# Patient Record
Sex: FEMALE | Race: WHITE | NOT HISPANIC OR LATINO | URBAN - METROPOLITAN AREA
[De-identification: names, ages, dates, MRNs, and addresses within clinical notes are randomized per-mention and may not be internally consistent; named-entity substitution may affect disease eponyms.]

---

## 2017-05-19 ENCOUNTER — EMERGENCY (EMERGENCY)
Facility: HOSPITAL | Age: 82
LOS: 0 days | Discharge: HOME | End: 2017-05-19

## 2017-06-28 DIAGNOSIS — E78.00 PURE HYPERCHOLESTEROLEMIA, UNSPECIFIED: ICD-10-CM

## 2017-06-28 DIAGNOSIS — R04.0 EPISTAXIS: ICD-10-CM

## 2017-06-28 DIAGNOSIS — I10 ESSENTIAL (PRIMARY) HYPERTENSION: ICD-10-CM

## 2017-06-28 DIAGNOSIS — Z79.899 OTHER LONG TERM (CURRENT) DRUG THERAPY: ICD-10-CM

## 2017-06-28 DIAGNOSIS — Z88.0 ALLERGY STATUS TO PENICILLIN: ICD-10-CM

## 2017-07-09 ENCOUNTER — OUTPATIENT (OUTPATIENT)
Dept: OUTPATIENT SERVICES | Facility: HOSPITAL | Age: 82
LOS: 1 days | Discharge: HOME | End: 2017-07-09

## 2017-07-09 DIAGNOSIS — R05 COUGH: ICD-10-CM

## 2018-08-23 ENCOUNTER — INPATIENT (INPATIENT)
Facility: HOSPITAL | Age: 83
LOS: 4 days | Discharge: NURSING, REHAB OR LONG-TERM CARE | End: 2018-08-28
Attending: INTERNAL MEDICINE | Admitting: INTERNAL MEDICINE

## 2018-08-23 VITALS
DIASTOLIC BLOOD PRESSURE: 62 MMHG | RESPIRATION RATE: 16 BRPM | OXYGEN SATURATION: 96 % | TEMPERATURE: 96 F | HEART RATE: 122 BPM | SYSTOLIC BLOOD PRESSURE: 142 MMHG

## 2018-08-23 LAB
ALBUMIN SERPL ELPH-MCNC: 3.8 G/DL — SIGNIFICANT CHANGE UP (ref 3.5–5.2)
ALP SERPL-CCNC: 79 U/L — SIGNIFICANT CHANGE UP (ref 30–115)
ALT FLD-CCNC: 10 U/L — SIGNIFICANT CHANGE UP (ref 0–41)
ANION GAP SERPL CALC-SCNC: 15 MMOL/L — HIGH (ref 7–14)
APPEARANCE UR: CLEAR — SIGNIFICANT CHANGE UP
AST SERPL-CCNC: 15 U/L — SIGNIFICANT CHANGE UP (ref 0–41)
BACTERIA # UR AUTO: ABNORMAL /HPF
BASOPHILS # BLD AUTO: 0.04 K/UL — SIGNIFICANT CHANGE UP (ref 0–0.2)
BASOPHILS NFR BLD AUTO: 0.3 % — SIGNIFICANT CHANGE UP (ref 0–1)
BILIRUB SERPL-MCNC: 0.8 MG/DL — SIGNIFICANT CHANGE UP (ref 0.2–1.2)
BILIRUB UR-MCNC: NEGATIVE — SIGNIFICANT CHANGE UP
BUN SERPL-MCNC: 15 MG/DL — SIGNIFICANT CHANGE UP (ref 10–20)
CALCIUM SERPL-MCNC: 8.9 MG/DL — SIGNIFICANT CHANGE UP (ref 8.5–10.1)
CHLORIDE SERPL-SCNC: 99 MMOL/L — SIGNIFICANT CHANGE UP (ref 98–110)
CO2 SERPL-SCNC: 26 MMOL/L — SIGNIFICANT CHANGE UP (ref 17–32)
COLOR SPEC: YELLOW — SIGNIFICANT CHANGE UP
CREAT SERPL-MCNC: 0.7 MG/DL — SIGNIFICANT CHANGE UP (ref 0.7–1.5)
DIFF PNL FLD: NEGATIVE — SIGNIFICANT CHANGE UP
EOSINOPHIL # BLD AUTO: 0.02 K/UL — SIGNIFICANT CHANGE UP (ref 0–0.7)
EOSINOPHIL NFR BLD AUTO: 0.2 % — SIGNIFICANT CHANGE UP (ref 0–8)
EPI CELLS # UR: ABNORMAL /HPF
GLUCOSE SERPL-MCNC: 164 MG/DL — HIGH (ref 70–99)
GLUCOSE UR QL: NEGATIVE — SIGNIFICANT CHANGE UP
HCT VFR BLD CALC: 45.4 % — SIGNIFICANT CHANGE UP (ref 37–47)
HGB BLD-MCNC: 15.1 G/DL — SIGNIFICANT CHANGE UP (ref 12–16)
IMM GRANULOCYTES NFR BLD AUTO: 0.6 % — HIGH (ref 0.1–0.3)
KETONES UR-MCNC: NEGATIVE — SIGNIFICANT CHANGE UP
LACTATE SERPL-SCNC: 1.7 MMOL/L — SIGNIFICANT CHANGE UP (ref 0.5–2.2)
LEUKOCYTE ESTERASE UR-ACNC: NEGATIVE — SIGNIFICANT CHANGE UP
LIDOCAIN IGE QN: 12 U/L — SIGNIFICANT CHANGE UP (ref 7–60)
LYMPHOCYTES # BLD AUTO: 0.62 K/UL — LOW (ref 1.2–3.4)
LYMPHOCYTES # BLD AUTO: 5 % — LOW (ref 20.5–51.1)
MAGNESIUM SERPL-MCNC: 1.9 MG/DL — SIGNIFICANT CHANGE UP (ref 1.8–2.4)
MCHC RBC-ENTMCNC: 28.5 PG — SIGNIFICANT CHANGE UP (ref 27–31)
MCHC RBC-ENTMCNC: 33.3 G/DL — SIGNIFICANT CHANGE UP (ref 32–37)
MCV RBC AUTO: 85.7 FL — SIGNIFICANT CHANGE UP (ref 81–99)
MONOCYTES # BLD AUTO: 1.82 K/UL — HIGH (ref 0.1–0.6)
MONOCYTES NFR BLD AUTO: 14.5 % — HIGH (ref 1.7–9.3)
NEUTROPHILS # BLD AUTO: 9.94 K/UL — HIGH (ref 1.4–6.5)
NEUTROPHILS NFR BLD AUTO: 79.4 % — HIGH (ref 42.2–75.2)
NITRITE UR-MCNC: NEGATIVE — SIGNIFICANT CHANGE UP
NRBC # BLD: 0 /100 WBCS — SIGNIFICANT CHANGE UP (ref 0–0)
PH UR: 6 — SIGNIFICANT CHANGE UP (ref 5–8)
PLATELET # BLD AUTO: 293 K/UL — SIGNIFICANT CHANGE UP (ref 130–400)
POTASSIUM SERPL-MCNC: 3.5 MMOL/L — SIGNIFICANT CHANGE UP (ref 3.5–5)
POTASSIUM SERPL-SCNC: 3.5 MMOL/L — SIGNIFICANT CHANGE UP (ref 3.5–5)
PROT SERPL-MCNC: 6.2 G/DL — SIGNIFICANT CHANGE UP (ref 6–8)
PROT UR-MCNC: 30
RBC # BLD: 5.3 M/UL — SIGNIFICANT CHANGE UP (ref 4.2–5.4)
RBC # FLD: 14.4 % — SIGNIFICANT CHANGE UP (ref 11.5–14.5)
SODIUM SERPL-SCNC: 140 MMOL/L — SIGNIFICANT CHANGE UP (ref 135–146)
SP GR SPEC: >=1.03 — SIGNIFICANT CHANGE UP (ref 1.01–1.03)
UROBILINOGEN FLD QL: 1 (ref 0.2–0.2)
WBC # BLD: 12.51 K/UL — HIGH (ref 4.8–10.8)
WBC # FLD AUTO: 12.51 K/UL — HIGH (ref 4.8–10.8)
WBC UR QL: SIGNIFICANT CHANGE UP /HPF

## 2018-08-23 RX ORDER — APIXABAN 2.5 MG/1
2.5 TABLET, FILM COATED ORAL ONCE
Qty: 0 | Refills: 0 | Status: COMPLETED | OUTPATIENT
Start: 2018-08-23 | End: 2018-08-23

## 2018-08-23 RX ORDER — ACETAMINOPHEN 500 MG
650 TABLET ORAL ONCE
Qty: 0 | Refills: 0 | Status: COMPLETED | OUTPATIENT
Start: 2018-08-23 | End: 2018-08-23

## 2018-08-23 RX ORDER — METHOCARBAMOL 500 MG/1
750 TABLET, FILM COATED ORAL ONCE
Qty: 0 | Refills: 0 | Status: COMPLETED | OUTPATIENT
Start: 2018-08-23 | End: 2018-08-23

## 2018-08-23 RX ADMIN — APIXABAN 2.5 MILLIGRAM(S): 2.5 TABLET, FILM COATED ORAL at 20:27

## 2018-08-23 RX ADMIN — METHOCARBAMOL 750 MILLIGRAM(S): 500 TABLET, FILM COATED ORAL at 12:49

## 2018-08-23 RX ADMIN — Medication 650 MILLIGRAM(S): at 12:49

## 2018-08-23 RX ADMIN — Medication 650 MILLIGRAM(S): at 13:50

## 2018-08-23 NOTE — ED ADULT NURSE NOTE - CHPI ED NUR SYMPTOMS NEG
no weakness/no vomiting/no confusion/no change in level of consciousness/no blurred vision/no loss of consciousness/no nausea/no numbness/no fever

## 2018-08-23 NOTE — ED ADULT NURSE REASSESSMENT NOTE - NS ED NURSE REASSESS COMMENT FT1
Pt A&OX4, on observation for inability to ambulate. Pt aware and compliant with plan of care. Pt received eliquis 2.5 mg PO as per home medication. Pt in no acute distress at this time. Will continue to monitor

## 2018-08-23 NOTE — ED CDU PROVIDER INITIAL DAY NOTE - OBJECTIVE STATEMENT
87 y/o female with PMHX of HTN, A. Fib on Eliquis, RA presenting under two midnight protocol. Pt currently lives with daughter but is in the downstairs apartment by herself. As per daughter, she went downstairs to check on mother, and tried to get her out of bed to use the bathroom, pt was unable to get up due to pain. Daughter states  pt has severe pain in B/L legs that she was unable to ambulate and had pain in her neck. Daughter does not want patient to  go to rehab/ nursing home facility. Pt for  and PT evaluation

## 2018-08-23 NOTE — ED CDU PROVIDER INITIAL DAY NOTE - PROGRESS NOTE DETAILS
The patient is awake and alert, states she is feeling better after medication she was given in ED, able to move all her extremities equally.

## 2018-08-23 NOTE — ED PROVIDER NOTE - OBJECTIVE STATEMENT
88 y f pmh RA, afib on eliquis, htn pw multiple complaints. Feeling dizzy for the past few days. Feels off balance when she walks. Associated with pain over the neck bilaterally. Worsens with movement, alleviated with rest. Also c/o diffuse abdominal pain. No alleviating or exacerbating factors. Was given prednisone for the neck pain yesterday by her PMD with no improvement. Also uses rituxin shots for her neck pain which she received last week with minimal relief. Denies n/v, fever, chills, dysuria, hematuria, diarrhea, constipation.

## 2018-08-23 NOTE — ED PROVIDER NOTE - PHYSICAL EXAMINATION
CONSTITUTIONAL: Well-developed; well-nourished; in no acute distress.   SKIN: warm, dry  HEAD: Normocephalic; atraumatic.  EYES: PERRL, EOMI, normal sclera and conjunctiva   ENT: No nasal discharge; airway clear. Tms clear, no infection/drainage, bulging membranes or erythema.   NECK: TTP over paraspinal muscles, pain with passive and active movement.   CARD: S1, S2 normal; no murmurs, gallops, or rubs. Regular rate and rhythm.   RESP: No wheezes, rales or rhonchi.  ABD: soft, nondistended. Diffuse ttp with no rebound/guarding.   EXT: Normal ROM.  No clubbing, cyanosis or edema.   LYMPH: No acute cervical adenopathy.  NEURO: Alert, oriented, grossly unremarkable. No cranial nerve deficits, moving all extremities.   PSYCH: Cooperative, appropriate.

## 2018-08-23 NOTE — ED ADULT NURSE NOTE - NSIMPLEMENTINTERV_GEN_ALL_ED
Implemented All Fall with Harm Risk Interventions:  [place] to call system. Call bell, personal items and telephone within reach. Instruct patient to call for assistance. Room bathroom lighting operational. Non-slip footwear when patient is off stretcher. Physically safe environment: no spills, clutter or unnecessary equipment. Stretcher in lowest position, wheels locked, appropriate side rails in place. Provide visual cue, wrist band, yellow gown, etc. Monitor gait and stability. Monitor for mental status changes and reorient to person, place, and time. Review medications for side effects contributing to fall risk. Reinforce activity limits and safety measures with patient and family. Provide visual clues: red socks.

## 2018-08-23 NOTE — ED PROVIDER NOTE - ATTENDING CONTRIBUTION TO CARE
88F to ED with c/o diziness, abd pain, neck pain and multiple medical complaints.  To Ed with daughter and states sx are improving while in ED and diziness has been chronic over several years. No fevers, no sick contacts, no travels.     AVSS, exam as noted, CTAB, RRR, abdomen soft NTND, (+) bowel sounds,

## 2018-08-23 NOTE — ED PROVIDER NOTE - NS ED ROS FT
Eyes:  No visual changes, eye pain or discharge.  ENMT:  Neck pain. No hearing changes, pain, discharge or infections.   Cardiac:  No chest pain, SOB or edema.   Respiratory:  No cough or respiratory distress.   GI:  Abdominal pain. No nausea, vomiting, diarrhea  :  No dysuria, frequency or burning.  MS:  No myalgia, muscle weakness, joint pain or back pain.  Neuro:  Dizziness. No headache or weakness.  No LOC.  Skin:  No skin rash.   Endocrine: No history of thyroid disease or diabetes.

## 2018-08-23 NOTE — ED PROVIDER NOTE - MEDICAL DECISION MAKING DETAILS
pt not able to ambulate at baseline as per daughter and does not feel comfortable taking her home as she lives alone, will place in obs for pt and social work maggie jaramillo pending, obs team aware to follow up

## 2018-08-23 NOTE — ED ADULT NURSE NOTE - OBJECTIVE STATEMENT
Pt AOx3, ambulatory with assist, c/o neck pain, subjective fever and worsening LLE pain since yesterday. Pt indicates chronic dizziness. Deneis n/v, problems with BM or urination, No SOB, no chest pain, no recent falls/trauma, changes in LOC.

## 2018-08-23 NOTE — ED CDU PROVIDER INITIAL DAY NOTE - ATTENDING CONTRIBUTION TO CARE
87 yo female PMH as noted placed in EDOU for observation; patient c/o today f b/l leg pain and difficulties ambulating.  In addition, she reported neck pain for which her doctor prescribed meds which did not help.  No headache or focal weakness.  Patient able to lift both lower extremities against gravity, equal and strong hand  b/l, awake and alert and follows all directions appropriately.  Will observe and reassess for ability to ambulate ( usually ambulates with a walker).

## 2018-08-24 DIAGNOSIS — G56.00 CARPAL TUNNEL SYNDROME, UNSPECIFIED UPPER LIMB: Chronic | ICD-10-CM

## 2018-08-24 RX ORDER — AMLODIPINE BESYLATE 2.5 MG/1
0 TABLET ORAL
Qty: 90 | Refills: 0 | COMMUNITY

## 2018-08-24 RX ORDER — APIXABAN 2.5 MG/1
2.5 TABLET, FILM COATED ORAL ONCE
Qty: 0 | Refills: 0 | Status: COMPLETED | OUTPATIENT
Start: 2018-08-24 | End: 2018-08-24

## 2018-08-24 RX ORDER — AMLODIPINE BESYLATE 2.5 MG/1
2.5 TABLET ORAL DAILY
Qty: 0 | Refills: 0 | Status: DISCONTINUED | OUTPATIENT
Start: 2018-08-24 | End: 2018-08-28

## 2018-08-24 RX ORDER — APIXABAN 2.5 MG/1
2.5 TABLET, FILM COATED ORAL EVERY 12 HOURS
Qty: 0 | Refills: 0 | Status: DISCONTINUED | OUTPATIENT
Start: 2018-08-24 | End: 2018-08-28

## 2018-08-24 RX ORDER — AMLODIPINE BESYLATE 2.5 MG/1
2.5 TABLET ORAL ONCE
Qty: 0 | Refills: 0 | Status: COMPLETED | OUTPATIENT
Start: 2018-08-24 | End: 2018-08-24

## 2018-08-24 RX ORDER — METHOCARBAMOL 500 MG/1
750 TABLET, FILM COATED ORAL ONCE
Qty: 0 | Refills: 0 | Status: COMPLETED | OUTPATIENT
Start: 2018-08-24 | End: 2018-08-24

## 2018-08-24 RX ORDER — IBUPROFEN 200 MG
600 TABLET ORAL EVERY 6 HOURS
Qty: 0 | Refills: 0 | Status: DISCONTINUED | OUTPATIENT
Start: 2018-08-24 | End: 2018-08-28

## 2018-08-24 RX ORDER — SIMVASTATIN 20 MG/1
20 TABLET, FILM COATED ORAL AT BEDTIME
Qty: 0 | Refills: 0 | Status: DISCONTINUED | OUTPATIENT
Start: 2018-08-24 | End: 2018-08-28

## 2018-08-24 RX ORDER — ACETAMINOPHEN 500 MG
650 TABLET ORAL ONCE
Qty: 0 | Refills: 0 | Status: COMPLETED | OUTPATIENT
Start: 2018-08-24 | End: 2018-08-24

## 2018-08-24 RX ORDER — HYDROCHLOROTHIAZIDE 25 MG
12.5 TABLET ORAL AT BEDTIME
Qty: 0 | Refills: 0 | Status: DISCONTINUED | OUTPATIENT
Start: 2018-08-24 | End: 2018-08-28

## 2018-08-24 RX ORDER — SIMVASTATIN 20 MG/1
0 TABLET, FILM COATED ORAL
Qty: 90 | Refills: 0 | COMMUNITY

## 2018-08-24 RX ORDER — APIXABAN 2.5 MG/1
0 TABLET, FILM COATED ORAL
Qty: 180 | Refills: 0 | COMMUNITY

## 2018-08-24 RX ADMIN — APIXABAN 2.5 MILLIGRAM(S): 2.5 TABLET, FILM COATED ORAL at 21:55

## 2018-08-24 RX ADMIN — AMLODIPINE BESYLATE 2.5 MILLIGRAM(S): 2.5 TABLET ORAL at 09:41

## 2018-08-24 RX ADMIN — SIMVASTATIN 20 MILLIGRAM(S): 20 TABLET, FILM COATED ORAL at 21:56

## 2018-08-24 RX ADMIN — Medication 20 MILLIGRAM(S): at 09:41

## 2018-08-24 RX ADMIN — METHOCARBAMOL 750 MILLIGRAM(S): 500 TABLET, FILM COATED ORAL at 09:41

## 2018-08-24 RX ADMIN — Medication 12.5 MILLIGRAM(S): at 21:56

## 2018-08-24 RX ADMIN — APIXABAN 2.5 MILLIGRAM(S): 2.5 TABLET, FILM COATED ORAL at 09:41

## 2018-08-24 RX ADMIN — Medication 650 MILLIGRAM(S): at 17:55

## 2018-08-24 NOTE — H&P ADULT - ASSESSMENT
88 y f pmh RA, afib on eliquis, htn pw     ·	Dizziness: Feeling dizzy for the past few days. Feels off balance when she walks.   ·	Neck pain:  Worsens with movement, alleviated with rest. was given prednisone by pmd wtihout improvement.  uses Rituxan shots, last rec'ed last week with minimal relief.   Abd pain: No alleviating or exacerbating factors. Was given prednisone for the neck pain yesterday by her PMD with no improvement.       RA (rheumatoid arthritis)  Afib:  cw eliquis  HTN :  cw    CHG 4% bath daily and prn  DVT PPX  DISPO:  STR in SNF vs home VN per physiatry 88 y f pmh RA (receiving rituxan infusions,  last one ~1 week ptp), afib on eliquis, htn, hodgkin's lymphoma (in remission per pt) pw ~2d history of acute worsening of chr neck pain.      NECK PAIN / RA  --  imaging:   ·	ct neck:    ·	1.  No evidence of acute cervical spine fracture or subluxation.  ·	2.  Diffuse osteopenia and multilevel degenerative changes as described.  --  ptrehab:  rec snf vs home vn (pt/fam interested in snf,  would like to speak with sw)  --  cw rituxn inf o/p      Afib:  cw eliquis  HTN :  cw hctz, amlodpine    CHG 4% bath daily and prn  DVT PPX  DISPO:  STR in SNF vs home VN per physiatry; / f/u for dispo

## 2018-08-24 NOTE — ED CDU PROVIDER SUBSEQUENT DAY NOTE - NS ED ROS FT
Except as documented in HPI, all other ROS negative.   GENERAL: Denies fever/chills, loss of appetite/weight or fatigue.  SKIN: Denies rashes, abrasions, lacerations, ecchymosis, erythema, or edema.  HEAD: Denies headache, dizziness or trauma.  EYES: Denies blurry vision, diplopia, or photophobia.  ENT: Denies earaches, discharge or hearing loss. Denies nasal discharge or epistaxis. Denies sore throat.   CARDIAC: Denies chest pain, palpitations, or SOB.   RESPIRATORY: Denies SOB, cough, hemoptysis or wheezing.   GI: Denies abdominal pain, n/v/d.   MSK: + b/l leg & neck pain.   NEURO: Denies paresthesias, tingling, weakness.

## 2018-08-24 NOTE — ED ADULT NURSE REASSESSMENT NOTE - NS ED NURSE REASSESS COMMENT FT1
Patient aaox3 denies any pain, currently awaiting to be seen by pt in am, In no distress will continue to monitor. V/s within normal limits. Patient aaox3 denies any pain, currently awaiting to be seen by social workers in am, patient in no distress will continue to monitor. V/s within normal limits.

## 2018-08-24 NOTE — H&P ADULT - HISTORY OF PRESENT ILLNESS
88 y f pmh RA, afib on eliquis, htn pw     ·	Dizziness: Feeling dizzy for the past few days. Feels off balance when she walks.   ·	Neck pain:  Worsens with movement, alleviated with rest. was given prednisone by pmd wtihout improvement.  uses Rituxan shots, last rec'ed last week with minimal relief.   ·	Abd pain: No alleviating or exacerbating factors. Was given prednisone for the neck pain yesterday by her PMD with no improvement. 88 y f pmh RA (receiving rituxan infusions,  last one ~1 week ptp), afib on eliquis, htn, hodgkin's lymphoma (in remission per pt) pw ~2d history of acute worsening of chr neck pain.      Pt states she's had neck pains and other joint pains from her RA for years,  but since she's been on rituxan infusions, her pains have been greatly alleviated.  However ~2d ptp,  pt started to have acute neck pains,  radiating to b/l shoulders, that prevented her from ambulating.  Her pains are worsened by passive leg raise (performed by daughter at home), which made her neck pains radiate down her spine (pt states it normally doesn't radiate down her back, but it did this time bc her daughter raised her leg too high - 'into the ceiling').  Her pains were not alleviated, and per daughter pt spiked a low grade temp of 100.1, so she decided to bring pt to the Ed  .   Aside from above symptoms,  ros positive for  ·	Dizziness: Feeling dizzy for the past few days. Feels off balance when she walks.   ·	Occasional constipation.     Pt otherwise denied any chills/rigors,  cough, sob, cp, abd pain, n/v/d.

## 2018-08-24 NOTE — ED CDU PROVIDER DISPOSITION NOTE - CLINICAL COURSE
Elderly pt h/o RA on rituxan placed in CDU in consideration of rehab for pain and difficulty ambulating. Pt was observed in the ED, had some waxing and waning sx, daughter uncomfortable with d/c. Pt seen by physiatry, rec pain management and PT. Pt admitted to medical floor.

## 2018-08-24 NOTE — H&P ADULT - NSHPPHYSICALEXAM_GEN_ALL_CORE
Constitutional: non-toxic,  not in acute distress  HEENT: eomi,  wnl  Respiratory:  clear lung sounds b/l;   Cardiovascular:  s1, s2,  irregular  Gastrointestinal:  nontender, distended, +bowel sounds  Extremities: no edema b/l le  Neurological: nonfocal; no spinal tenderness on palpation    ------------------------------------------------------------------     VITAL SIGNS   T(F): 97.9 (08-24 @ 15:31), Max: 99.4 (08-24 @ 00:04)  HR: 107 (08-24 @ 18:12)  BP: 135/63 (08-24 @ 15:31)  RR: 18 (08-24 @ 15:31)  SpO2: 97% (08-24 @ 15:31)

## 2018-08-24 NOTE — ED CDU PROVIDER SUBSEQUENT DAY NOTE - PROGRESS NOTE DETAILS
Pt seen an examined this morning. Per pt and daughter at bedside, pt has h/o RA with Rituxin infusions about 4x per year, which controls symptoms very well. Pt has occasional breakthrough pain which resolves with tylenol. Daughter lives in apt above mother and gives meals, meds and supervises showering, but otherwise pt is independent w ADLS, ambulatory around her apt independently etc. Last Rituxan tx was 8/14. Daughter reports that pt began c/o neck and shoulder pain 2 nights ago, called rheumatologist Dr Matta (Dr Romero responded) and was told to give prednisone 15mg. Pain persisted next day, so she called back and was told to increase prednisone to 20mg. She felt concerned about higher dosing and pain seemed worse yesterday, so came to the ED. Labs, head CT, abd/pelvis CT were unremarkable, but concern was for unsafe d/c vs need for PT. Pt was given robaxin which improved sx somewhat. On my eval pt awake and alert, eating breakfast, cheerful and conversant. Daughter at bedside. VS as noted, pt is elderly but well appearing. She does c/o pain with rotation of the neck and there is palpable spasm of the cervical paraspinal and trapezius over the L shoulder. She is able to get to the edge of the bed unassisted and stands with just a little help; she is able to walk about the room, only c/o the same neck and shoulder pain. Daughter is adamant that she does not want placement, pt does not want aide or even PT. I do not think PT would be helpful with this acute flare of sx; will give prednisone and robaxin, speak with Dr Romero with goal of d/c home with prompt reassessment as outpt. Spoke with Dr Romero, covering for pt's rheumatologist Dr Matta. She rec CT scan as pts with longstanding RA can have subluxation of the cspine. Pt currently sleeping; if CT is ok, will consult rehab/SW. Pt is very drowsy now, possibly from the Robaxin given earlier; daughter now very concerned and does not want to take her home. Still awaiting CT neck; at this point if pt is not closer to baseline and CT is negative, she may require admission for rehab evaluation over the weekend. Will continue to observe, f/u CT and reassess. CT c-spine with degenerative changes, no acute subluxation. Pt more alert now, conversant, but still c/o neck pain. Physiatry at the bedside for eval for PT. Pt seen by physiatry. Rec bedside PT, pain management eval; could have PT as outpt with VNS vs short term rehab; daughter was initially uncomfortable with anything but discharge, now does not want to take pt home as she still has pain, and would consider short term rehab. Will give tylenol and admit for possible short term rehab.

## 2018-08-24 NOTE — CONSULT NOTE ADULT - ASSESSMENT
IMPRESSION: Rehab of gait dysfunction      PRECAUTIONS: [  ] Cardiac  [  ] Respiratory  [  ] Seizures [  ] Contact Isolation  [  ] Droplet Isolation  [  ] Other    Weight Bearing Status:     RECOMMENDATION: better pain control / pain management eval    Out of Bed to Chair     DVT/Decubiti Prophylaxis    REHAB PLAN:     [ x  ] Bedside P/T 3-5 times a week   [   ]   Bedside O/T  2-3 times a week             [   ] No Rehab Therapy Indicated                   [   ]  Speech Therapy   Conditioning/ROM                                    ADL  Bed Mobility                                               Conditioning/ROM  Transfers                                                     Bed Mobility  Sitting /Standing Balance                         Transfers                                        Gait Training                                               Sitting/Standing Balance  Stair Training [   ]Applicable                    Home equipment Eval                                                                        Splinting  [   ] Only      GOALS:   ADL   [   ]   Independent                    Transfers  [ x  ] Independent                          Ambulation  [ x  ] Independent     [  x  ] With device                            [   ]  CG                                                         [   ]  CG                                                                  [   ] CG                            [    ] Min A                                                   [   ] Min A                                                              [   ] Min  A          DISCHARGE PLAN:   [   ]  Good candidate for Intensive Rehabilitation/Hospital based-4A [facility]                                             Will tolerate 3hrs Intensive Rehab Daily                                       [ x   ]  Short Term Rehab in Skilled Nursing Facility                           vs            [  xx  ]  Home with Outpatient or VN services                                         [    ]  Possible Candidate for Intensive Hospital based Rehab

## 2018-08-24 NOTE — ED CDU PROVIDER SUBSEQUENT DAY NOTE - HISTORY
Pt resting comfortably at this time. Pt given dose of Eliquis as ordered. Pt with no complaints. Awaiting social work eval for PT in the AM - daughter prefers home PT as opposed to permanent placement in facility. Pt reporting no pain after receiving Tylenol & Robaxin.

## 2018-08-24 NOTE — PHYSICAL THERAPY INITIAL EVALUATION ADULT - SPECIFY REASON(S)
Chart reviewed. Pt. currently without activity orders. PT evaluation on hold pending OOB orders as appropriate. Will follow.

## 2018-08-24 NOTE — ED CDU PROVIDER SUBSEQUENT DAY NOTE - PHYSICAL EXAMINATION
VITAL SIGNS: I have reviewed the initial vital signs.   CONSTITUTIONAL: Awake, alert. Well-developed; well-nourished; in no distress. Non-toxic appearing.   SKIN: No rash, vesicles/lesion, abrasions or lacerations. No ecchymosis or signs of trauma.   HEAD: Normocephalic; atraumatic.   EYES: Symmetrical, no discharge or signs of trauma. Conjunctiva and sclera clear.  ENT: Airway patent. MMM.   NECK: Supple; non-tender.   CARD: No chest wall deformity or tenderness. S1, S2 normal; no murmurs, gallops, or rubs. Regular rate and rhythm.  RESP: Good air movement. Lungs CTAB. No crackles, wheezes, rales or rhonchi.  ABD: Soft; non-distended; non-tender.   EXT: No bony deformity or tenderness. Normal ROM x 4 extremities.   NEURO: Strength 5/5 UE/LE b/l. No sensory deficits. n/v intact UE/LE b/l, pulses symmetrical.  PSYCH: Cooperative, appropriate.

## 2018-08-24 NOTE — CONSULT NOTE ADULT - SUBJECTIVE AND OBJECTIVE BOX
HPI: 87 yo F admitted on 8/23 for neck pain / knee pain / unable to walk. Denies any trauma, xray negative for fx. Prior to hospitalization she was ambulating with QC      PAST MEDICAL & SURGICAL HISTORY:  Afib  HTN (hypertension)      Hospital Course:    TODAY'S SUBJECTIVE & REVIEW OF SYMPTOMS:     Constitutional WNL   Cardio WNL   Resp WNL   GI WNL  Heme WNL  Endo WNL  Skin WNL  MSK joint pain  Neuro WNL  Cognitive WNL  Psych WNL      MEDICATIONS  (STANDING):    MEDICATIONS  (PRN):      FAMILY HISTORY:      Allergies    penicillin (Short breath; Rash)    Intolerances        SOCIAL HISTORY:    [  ] Etoh  [  ] Smoking  [  ] Substance abuse     Home Environment:  [  ] Home Alone  [ x ] Lives with Family  [  ] Home Health Aid    Dwelling:  [  ] Apartment  [x  ] Private House  [  ] Adult Home  [  ] Skilled Nursing Facility      [  ] Short Term  [  ] Long Term  [x  ] Stairs       Elevator [  ]    FUNCTIONAL STATUS PTA: (Check all that apply)  Ambulation: [ x  ]Independent    [  ] Dependent     [  ] Non-Ambulatory  Assistive Device: [  ] SA Cane  [x  ]  Q Cane  [  ] Walker  [  ]  Wheelchair  ADL : [ x ] Independent  [  ]  Dependent       Vital Signs Last 24 Hrs  T(C): 36.6 (24 Aug 2018 15:31), Max: 37.4 (24 Aug 2018 00:04)  T(F): 97.9 (24 Aug 2018 15:31), Max: 99.4 (24 Aug 2018 00:04)  HR: 113 (24 Aug 2018 15:31) (75 - 113)  BP: 135/63 (24 Aug 2018 15:31) (103/65 - 141/63)  BP(mean): --  RR: 18 (24 Aug 2018 15:31) (17 - 20)  SpO2: 97% (24 Aug 2018 15:31) (96% - 99%)      PHYSICAL EXAM: Alert & Oriented X3  GENERAL: NAD, well-groomed, well-developed  HEAD:  Atraumatic, Normocephalic  CHEST/LUNG: Clear  HEART: S1S2+  ABDOMEN: Soft, Nontender  EXTREMITIES:  no calf tenderness    NERVOUS SYSTEM:  Cranial Nerves 2-12 intact [  ] Abnormal  [  ]  ROM: WFL all extremities [  ]  Abnormal [x  ]limited left shoulder  Motor Strength: WFL all extremities  [  ]  Abnormal [x  ]limited lue  Sensation: intact to light touch [  ] Abnormal [  ]  Reflexes: Symmetric [  ]  Abnormal [  ]    FUNCTIONAL STATUS:  Bed Mobility: Independent [  ]  Supervision [  ]  Needs Assistance [x  ]  N/A [  ]  Transfers: Independent [  ]  Supervision [  ]  Needs Assistance [x  ]  N/A [  ]   Ambulation: Independent [  ]  Supervision [  ]  Needs Assistance [  ]  N/A [  ]  ADL: Independent [  ] Requires Assistance [  ] N/A [  ]      LABS:                        15.1   12.51 )-----------( 293      ( 23 Aug 2018 12:45 )             45.4     08-23    140  |  99  |  15  ----------------------------<  164<H>  3.5   |  26  |  0.7    Ca    8.9      23 Aug 2018 12:45  Mg     1.9     08-23    TPro  6.2  /  Alb  3.8  /  TBili  0.8  /  DBili  x   /  AST  15  /  ALT  10  /  AlkPhos  79  08-23      Urinalysis Basic - ( 23 Aug 2018 17:00 )    Color: Yellow / Appearance: Clear / SG: >=1.030 / pH: x  Gluc: x / Ketone: Negative  / Bili: Negative / Urobili: 1.0   Blood: x / Protein: 30 / Nitrite: Negative   Leuk Esterase: Negative / RBC: x / WBC 3-5 /HPF   Sq Epi: x / Non Sq Epi: Few /HPF / Bacteria: Few /HPF        RADIOLOGY & ADDITIONAL STUDIES:    Assesment:

## 2018-08-24 NOTE — H&P ADULT - PMH
Afib    HTN (hypertension) Afib    Hodgkin lymphoma    HTN (hypertension)    RA (rheumatoid arthritis)

## 2018-08-25 LAB
ALBUMIN SERPL ELPH-MCNC: 3.6 G/DL — SIGNIFICANT CHANGE UP (ref 3.5–5.2)
ALP SERPL-CCNC: 83 U/L — SIGNIFICANT CHANGE UP (ref 30–115)
ALT FLD-CCNC: 19 U/L — SIGNIFICANT CHANGE UP (ref 0–41)
ANION GAP SERPL CALC-SCNC: 16 MMOL/L — HIGH (ref 7–14)
AST SERPL-CCNC: 22 U/L — SIGNIFICANT CHANGE UP (ref 0–41)
BASOPHILS # BLD AUTO: 0.04 K/UL — SIGNIFICANT CHANGE UP (ref 0–0.2)
BASOPHILS NFR BLD AUTO: 0.3 % — SIGNIFICANT CHANGE UP (ref 0–1)
BILIRUB SERPL-MCNC: 0.6 MG/DL — SIGNIFICANT CHANGE UP (ref 0.2–1.2)
BUN SERPL-MCNC: 13 MG/DL — SIGNIFICANT CHANGE UP (ref 10–20)
CALCIUM SERPL-MCNC: 8.9 MG/DL — SIGNIFICANT CHANGE UP (ref 8.5–10.1)
CHLORIDE SERPL-SCNC: 99 MMOL/L — SIGNIFICANT CHANGE UP (ref 98–110)
CO2 SERPL-SCNC: 26 MMOL/L — SIGNIFICANT CHANGE UP (ref 17–32)
CREAT SERPL-MCNC: 0.5 MG/DL — LOW (ref 0.7–1.5)
EOSINOPHIL # BLD AUTO: 0.14 K/UL — SIGNIFICANT CHANGE UP (ref 0–0.7)
EOSINOPHIL NFR BLD AUTO: 1 % — SIGNIFICANT CHANGE UP (ref 0–8)
GLUCOSE SERPL-MCNC: 103 MG/DL — HIGH (ref 70–99)
HCT VFR BLD CALC: 44 % — SIGNIFICANT CHANGE UP (ref 37–47)
HGB BLD-MCNC: 14.7 G/DL — SIGNIFICANT CHANGE UP (ref 12–16)
IMM GRANULOCYTES NFR BLD AUTO: 0.7 % — HIGH (ref 0.1–0.3)
LYMPHOCYTES # BLD AUTO: 1.03 K/UL — LOW (ref 1.2–3.4)
LYMPHOCYTES # BLD AUTO: 7.6 % — LOW (ref 20.5–51.1)
MAGNESIUM SERPL-MCNC: 2.1 MG/DL — SIGNIFICANT CHANGE UP (ref 1.8–2.4)
MCHC RBC-ENTMCNC: 28.3 PG — SIGNIFICANT CHANGE UP (ref 27–31)
MCHC RBC-ENTMCNC: 33.4 G/DL — SIGNIFICANT CHANGE UP (ref 32–37)
MCV RBC AUTO: 84.8 FL — SIGNIFICANT CHANGE UP (ref 81–99)
MONOCYTES # BLD AUTO: 1.83 K/UL — HIGH (ref 0.1–0.6)
MONOCYTES NFR BLD AUTO: 13.6 % — HIGH (ref 1.7–9.3)
NEUTROPHILS # BLD AUTO: 10.34 K/UL — HIGH (ref 1.4–6.5)
NEUTROPHILS NFR BLD AUTO: 76.8 % — HIGH (ref 42.2–75.2)
PLATELET # BLD AUTO: 283 K/UL — SIGNIFICANT CHANGE UP (ref 130–400)
POTASSIUM SERPL-MCNC: 3.4 MMOL/L — LOW (ref 3.5–5)
POTASSIUM SERPL-SCNC: 3.4 MMOL/L — LOW (ref 3.5–5)
PROT SERPL-MCNC: 6 G/DL — SIGNIFICANT CHANGE UP (ref 6–8)
RBC # BLD: 5.19 M/UL — SIGNIFICANT CHANGE UP (ref 4.2–5.4)
RBC # FLD: 14.1 % — SIGNIFICANT CHANGE UP (ref 11.5–14.5)
SODIUM SERPL-SCNC: 141 MMOL/L — SIGNIFICANT CHANGE UP (ref 135–146)
WBC # BLD: 13.47 K/UL — HIGH (ref 4.8–10.8)
WBC # FLD AUTO: 13.47 K/UL — HIGH (ref 4.8–10.8)

## 2018-08-25 RX ORDER — GLYCERIN ADULT
1 SUPPOSITORY, RECTAL RECTAL DAILY
Qty: 0 | Refills: 0 | Status: DISCONTINUED | OUTPATIENT
Start: 2018-08-25 | End: 2018-08-28

## 2018-08-25 RX ORDER — ACETAMINOPHEN 500 MG
650 TABLET ORAL ONCE
Qty: 0 | Refills: 0 | Status: COMPLETED | OUTPATIENT
Start: 2018-08-25 | End: 2018-08-25

## 2018-08-25 RX ORDER — CHOLECALCIFEROL (VITAMIN D3) 125 MCG
1000 CAPSULE ORAL DAILY
Qty: 0 | Refills: 0 | Status: DISCONTINUED | OUTPATIENT
Start: 2018-08-25 | End: 2018-08-28

## 2018-08-25 RX ADMIN — APIXABAN 2.5 MILLIGRAM(S): 2.5 TABLET, FILM COATED ORAL at 21:44

## 2018-08-25 RX ADMIN — AMLODIPINE BESYLATE 2.5 MILLIGRAM(S): 2.5 TABLET ORAL at 06:35

## 2018-08-25 RX ADMIN — Medication 650 MILLIGRAM(S): at 18:09

## 2018-08-25 RX ADMIN — SIMVASTATIN 20 MILLIGRAM(S): 20 TABLET, FILM COATED ORAL at 21:44

## 2018-08-25 RX ADMIN — APIXABAN 2.5 MILLIGRAM(S): 2.5 TABLET, FILM COATED ORAL at 10:40

## 2018-08-25 RX ADMIN — Medication 12.5 MILLIGRAM(S): at 21:44

## 2018-08-25 NOTE — PROGRESS NOTE ADULT - SUBJECTIVE AND OBJECTIVE BOX
08-25-18 @ 11:29  KVNG REDDY  88yFemale  Unknown to me before, a patient of Dr. Omalley, last seen > a yr ago.  Seen in 4B8B. No ac c/o now. HOB elevated to ~50'.    Hx reviewed from pt, as well as calling home, speaking to Carlos, son in law, whose home, pt lives independently overall, as a mother daughter apt arrangement.       Patient is a 88y old  Female who presents with a chief complaint of neck pain (24 Aug 2018 19:14), & neck rigidity, progressively worse lately, causing her unable to turn around, get up from bed easily. Normally she manages herself, with some help from dtr & son in law.       HPI:  88 y f pmh RA (receiving rituxan infusions,  last one ~1 week ptp), afib on eliquis, htn, hodgkin's lymphoma (in remission per pt) pw ~2d history of acute worsening of chr neck pain.      Pt states she's had neck pains and other joint pains from her RA for years,  but since she's been on rituxan infusions, her pains have been greatly alleviated.  However ~2d ptp,  pt started to have acute neck pains,  radiating to b/l shoulders, that prevented her from ambulating.  Her pains are worsened by passive leg raise (performed by daughter at home), which made her neck pains radiate down her spine (pt states it normally doesn't radiate down her back, but it did this time bc her daughter raised her leg too high - 'into the ceiling').  Her pains were not alleviated, and per daughter pt spiked a low grade temp of 100.1, so she decided to bring pt to the Ed  .   Aside from above symptoms,  ros positive for  ·	Dizziness: Feeling dizzy for the past few days. Feels off balance when she walks.   ·	Occasional constipation.     Pt otherwise denied any chills/rigors,  cough, sob, cp, abd pain, n/v/d. (24 Aug 2018 19:14)      REVIEW OF SYSTEMS      General:	Overall OK    Respiratory and Thorax: No sx  	  Cardiovascular:	Chr, afib, does f/u cardio, on a/c, stable BP    Musculoskeletal:	 stiff neck. Known hx RA, no acute jt pains otherwise.     Neurological:	Chronic dementia    Psychiatric:	OK    Hematology/Lymphatics:	 No issue    Allergic/Immunologic:	    penicillin (Short breath; Rash)      Home Medications:  amLODIPine 2.5 mg oral tablet: 1 tab(s) orally once a day (24 Aug 2018 19:16)  Eliquis 2.5 mg oral tablet: 1 tab(s) orally 2 times a day (24 Aug 2018 19:16)  FLUTICASONE PROPIONATE 50 MCG/ACT SUSP:  (24 Aug 2018 19:16)  hydroCHLOROthiazide 12.5 mg oral tablet: 1 tab(s) orally once a day (24 Aug 2018 19:16)  simvastatin 20 mg oral tablet: orally once a day (24 Aug 2018 19:16)      MEDICATIONS  (STANDING):  amLODIPine   Tablet 2.5 milliGRAM(s) Oral daily  apixaban 2.5 milliGRAM(s) Oral every 12 hours  hydrochlorothiazide 12.5 milliGRAM(s) Oral at bedtime  simvastatin 20 milliGRAM(s) Oral at bedtime    MEDICATIONS  (PRN):  ibuprofen  Tablet 600 milliGRAM(s) Oral every 6 hours PRN Mild pain      PAST MEDICAL & SURGICAL HISTORY:  Hodgkin lymphoma  RA (rheumatoid arthritis)  Afib  HTN (hypertension)  Carpal tunnel syndrome      T(C): 36 (08-25-18 @ 07:31), Max: 36.6 (08-24-18 @ 15:31)  HR: 89 (08-25-18 @ 07:31) (89 - 113)  BP: 138/65 (08-25-18 @ 07:31) (125/68 - 138/65)  RR: 18 (08-25-18 @ 07:31) (18 - 95)  SpO2: 95% (08-25-18 @ 07:31) (94% - 97%)    PHYSICAL EXAM:      Constitutional: WBWN    Eyes: EOMI    Neck: Stiff, difficult ROM    Back: Upper stiffness, diff to lay flat, no palp deformity    Respiratory: NVBS, good AE    Cardiovascular: variable S1,    Gastrointestinal: benign abdo, no palp lesion    Extremities: No CCE, good SLR BL    Vascular: palp pedal pulse.     Neurological: Dementia ++, cooperative w/o confusion or agitation. DTR normal. No focal deficit    Skin: warm, no pallor. Healthy    Musculoskeletal: No atrophy. No active inflamed jts.     Psychiatric: amiable. Dementia                              14.7   13.47 )-----------( 283      ( 25 Aug 2018 06:35 )             44.0       08-25    141  |  99  |  13  ----------------------------<  103<H>  3.4<L>   |  26  |  0.5<L>    Ca    8.9      25 Aug 2018 06:35  Mg     2.1     08-25    TPro  6.0  /  Alb  3.6  /  TBili  0.6  /  DBili  x   /  AST  22  /  ALT  19  /  AlkPhos  83  08-25      Urinalysis Basic - ( 23 Aug 2018 17:00 )    Color: Yellow / Appearance: Clear / SG: >=1.030 / pH: x  Gluc: x / Ketone: Negative  / Bili: Negative / Urobili: 1.0   Blood: x / Protein: 30 / Nitrite: Negative   Leuk Esterase: Negative / RBC: x / WBC 3-5 /HPF   Sq Epi: x / Non Sq Epi: Few /HPF / Bacteria: Few /HPF    ECG :     Ventricular Rate 103 BPM    Atrial Rate 119 BPM    QRS Duration 80 ms    Q-T Interval 356 ms    QTC Calculation(Bezet) 466 ms    R Axis 66 degrees    T Axis 266 degrees    Diagnosis Line Atrial fibrillation with rapid ventricular response  Nonspecific T wave abnormality  Abnormal ECG    Confirmed by Orlando Viveros ([id number]) on 8/23/2018 12:36:21 PM      CT neck :     Findings:    Bones are diffusely osteopenic.    There is no evidence of acute fracture, compression deformity or facet   subluxation.    There is multilevel degenerative loss of disc space height. Trace   anterolisthesis C2-3 and C3-4.    At C2-3 there is disc bulging and facet hypertrophy without significant   canal or foraminal stenosis.    At C3-4 there is disc bulging and uncovertebral and facet hypertrophy   with moderate right and mild left foraminal stenosis.    At C4-5 there is disc osteophyte indenting the ventral thecal sac and   uncovertebral and facet hypertrophy with mild to moderate foraminal   stenosis.    At C5-6 there is disc osteophyte indenting the ventral thecal sac and   uncovertebral and facet hypertrophy with mild right foraminal stenosis.    At C6-7 there is disc osteophyte indenting the ventral thecal sac and   uncinate spurring with mild foraminal stenosis.    IMPRESSION:    1.  No evidence of acute cervical spine fracture or subluxation.    2.  Diffuse osteopenia and multilevel degenerative changes as described.        TIMOTEO PRESTON M.D., ATTENDING RADIOLOGIST  This document has been electronically signed. Aug 24 2018  3:18PM        CT A P :    FINDINGS:    LOWER CHEST: Bibasilar reticular opacities, possibly reflecting fibrosis.    HEPATOBILIARY: Unremarkable.    SPLEEN: Unremarkable.    PANCREAS: Unremarkable.    ADRENAL GLANDS: Unremarkable.    KIDNEYS: Bilateral renal cysts measure up to 2.2 cmwithin the left   interpolar region. Additional subcentimeter left renal lower pole   hypodensities are too small to further characterize. No hydronephrosis.     ABDOMINOPELVIC NODES: Unremarkable.    PELVIC ORGANS: An ovoid intrauterine device is noted.    PERITONEUM/MESENTERY/BOWEL: Sigmoid and descending colonic   diverticulosis, without evidence of diverticulitis. No evidence of bowel   obstruction. No ascites or free intraperitoneal air. Normal caliber   appendix.    BONES/SOFT TISSUES: Degenerative changes of the spine.      OTHER: Atherosclerotic vascular calcification.      IMPRESSION:     1. No evidence of acute/inflammatory process within the abdomen or pelvis.    2. Sigmoid and descending colonic diverticulosis, without evidence of  diverticulitis.        ALYX KING M.D., ATTENDING RADIOLOGIST  This document has been electronically signed. Aug 23 2018  4:04PM    CT Head :    FINDINGS:    Mild prominence of the ventricles and cortical sulci, likely secondary to   age-related parenchymal volume loss. Gray-white matter differentiation is   preserved. No evidence of acute intracranial hemorrhage, mass effect or   midline shift. No acute osseous or soft tissue abnormalities. The   visualized paranasal sinuses and mastoids are well aerated.     There are scattered patchy hypodensities throughout the hemispheric white   matter which are nonspecific and without mass effect, compatible with   chronic microvascular ischemic changes.    Beam hardening artifact is noted overlying the brain stem and posterior   fossa which is inherent to CT in this location.      IMPRESSION:    1.  No CT evidence for acute intracranial pathology.      2.  If the patient continues to be symptomatic follow-upMRI of the brain   may be helpful for further evaluation.      TRESSA AMOS M.D., RESIDENT RADIOLOGIST  This document has been electronically signed.  CRYSTAL JJ M.D., ATTENDING RADIOLOGIST    CXR :     Findings:    Support devices: None.    Cardiac/mediastinum/hilum: Stable cardiac silhouette.    Lung parenchyma/Pleura: Interstitial opacities.    Skeleton/soft tissues: Unremarkable.    Impression:      Interstitial opacities, which may represent mild edema.         JONAS WALLACE M.D., ATTENDING RADIOLOGIST  This document has been electronically signed. Aug 23 2018  1:24PM        ASS : /Plan :    Stiff neck : CT degen dis. C/w ms stiffness. Rehab, PT  Back pain & Neck pain : likely postural or strain since relatively new sx as per family.   Atr. Fib : Chronic on Eliquis. Rate sub optimal control. CXR ? congestion. Will get Echo to eval  RA : been on Rx. Clinically well controlled  Dementia : Chronic as per family, no worsening. Will get neuro eval.   Hx of Hodgkin lymphoma : No active issue at present  HTN : stable

## 2018-08-25 NOTE — CONSULT NOTE ADULT - SUBJECTIVE AND OBJECTIVE BOX
KVNG REDDY     88y     Female    MRN-[id number]                                                           CC:Patient is a 88y old  Female who presents with a chief complaint of shaky on legs, daughter concerned pt would fall (25 Aug 2018 11:55)      HPI:  88 y f pmh RA (receiving rituxan infusions,  last one ~1 week ptp), afib on eliquis, htn, hodgkin's lymphoma (in remission per pt) pw ~2d history of acute worsening of chr neck pain.      Pt states she's had neck pains and other joint pains from her RA for years,  but since she's been on rituxan infusions, her pains have been greatly alleviated.  However ~2d ptp,  pt started to have acute neck pains,  radiating to b/l shoulders, that prevented her from ambulating.  Her pains are worsened by passive leg raise (performed by daughter at home), which made her neck pains radiate down her spine (pt states it normally doesn't radiate down her back, but it did this time bc her daughter raised her leg too high - 'into the ceiling').  Her pains were not alleviated, and per daughter pt spiked a low grade temp of 100.1, so she decided to bring pt to the Ed  .   Aside from above symptoms,  ros positive for  ·	Dizziness: Feeling dizzy for the past few days. Feels off balance when she walks.   ·	Occasional constipation.     Pt otherwise denied any chills/rigors,  cough, sob, cp, abd pain, n/v/d. (24 Aug 2018 19:14)    Patient seen and examined and history obtained from Patient, daughter and EMR.  Patient was getting severe neck pain and back pain.  Pain was radiating from her neck down all extremities.  She also had numbness going into L>R legs and her walking became worse with the walker.  Pain worse when bending her head.  NO change in bowel or bladder habits    ROS:  Constitutional, Neurological, Psychiatric, Eyes, ENT, Cardiovascular, Respiratory, Gastrointestinal, Genitourinary, Musculoskeletal, Integumentary, Endocrine and Heme/Lymph are otherwise negative.       FAMILY HISTORY:  No pertinent family history in first degree relatives      HEALTH ISSUES - PROBLEM Dx:    SHX: (-)x3      Vital Signs Last 24 Hrs  T(C): 35.8 (25 Aug 2018 09:30), Max: 36.6 (24 Aug 2018 15:31)  T(F): 96.5 (25 Aug 2018 09:30), Max: 97.9 (24 Aug 2018 15:31)  HR: 90 (25 Aug 2018 09:30) (89 - 113)  BP: 119/56 (25 Aug 2018 09:30) (119/56 - 138/65)  BP(mean): --  RR: 18 (25 Aug 2018 09:30) (18 - 95)  SpO2: 95% (25 Aug 2018 07:31) (94% - 97%)          Neuro Exam:  a+ox3 language and attention normal  CN 2-12 normal  power 5/5 except right finger ext 4+/5, R-SA 4+/5  Sensory symmetric to PP, Temp, VIb  DTR 2+ in UE and 0 in LE; plantars down b/l  FTN NL  Gait needs walker    NIHSS: N/A    Allergies    penicillin (Short breath; Rash)    Intolerances       Home Medications:  amLODIPine 2.5 mg oral tablet: 1 tab(s) orally once a day (24 Aug 2018 19:16)  Eliquis 2.5 mg oral tablet: 1 tab(s) orally 2 times a day (24 Aug 2018 19:16)  FLUTICASONE PROPIONATE 50 MCG/ACT SUSP:  (24 Aug 2018 19:16)  hydroCHLOROthiazide 12.5 mg oral tablet: 1 tab(s) orally once a day (24 Aug 2018 19:16)  simvastatin 20 mg oral tablet: orally once a day (24 Aug 2018 19:16)      MEDICATIONS  (STANDING):  amLODIPine   Tablet 2.5 milliGRAM(s) Oral daily  apixaban 2.5 milliGRAM(s) Oral every 12 hours  hydrochlorothiazide 12.5 milliGRAM(s) Oral at bedtime  simvastatin 20 milliGRAM(s) Oral at bedtime    MEDICATIONS  (PRN):  ibuprofen  Tablet 600 milliGRAM(s) Oral every 6 hours PRN Mild pain      LABS:                        14.7   13.47 )-----------( 283      ( 25 Aug 2018 06:35 )             44.0     08-25    141  |  99  |  13  ----------------------------<  103<H>  3.4<L>   |  26  |  0.5<L>    Ca    8.9      25 Aug 2018 06:35  Mg     2.1     08-25    TPro  6.0  /  Alb  3.6  /  TBili  0.6  /  DBili  x   /  AST  22  /  ALT  19  /  AlkPhos  83  08-25            Neuro Imaging:  NCHCT: < from: CT Head No Cont (08.23.18 @ 15:26) >    INTERPRETATION:  Clinical History / Reason for exam: Dizziness, bilateral   neck pain, A. fib on eliquis.     TECHNIQUE: Contiguous axial CT images were obtained from the base of the  skull to the vertex without administration of intravenous contrast.   Coronal and sagittal reformatted images were constructed.    COMPARISON: CT head without contrast 6/2/2014.      FINDINGS:    Mild prominence of the ventricles and cortical sulci, likely secondary to   age-related parenchymal volume loss. Gray-white matter differentiation is   preserved. No evidence of acute intracranial hemorrhage, mass effect or   midline shift. No acute osseous or soft tissue abnormalities. The   visualized paranasal sinuses and mastoids are well aerated.     There are scattered patchy hypodensities throughout the hemispheric white   matter which are nonspecific and without mass effect, compatible with   chronic microvascular ischemic changes.    Beam hardening artifact is noted overlying the brain stem and posterior   fossa which is inherent to CT in this location.      IMPRESSION:    1.  No CT evidence for acute intracranial pathology.      2.  If the patient continues to be symptomatic follow-upMRI of the brain   may be helpful for further evaluation.      < end of copied text >    < from: CT Cervical Spine No Cont (08.24.18 @ 14:50) >    INTERPRETATION:  Clinical History / Reason for exam: Neck pain    Technique: CT cervical spine without contrast. Contiguous CT axial images   of the cervical spine with coronal and sagittal reformats.    Comparison: None available    Findings:    Bones are diffusely osteopenic.    There is no evidence of acute fracture, compression deformity or facet   subluxation.    There is multilevel degenerative loss of disc space height. Trace   anterolisthesis C2-3 and C3-4.    At C2-3 there is disc bulging and facet hypertrophy without significant   canal or foraminal stenosis.    At C3-4 there is disc bulging and uncovertebral and facet hypertrophy   with moderate right and mild left foraminal stenosis.    At C4-5 there is disc osteophyte indenting the ventral thecal sac and   uncovertebral and facet hypertrophy with mild to moderate foraminal   stenosis.    At C5-6 there is disc osteophyte indenting the ventral thecal sac and   uncovertebral and facet hypertrophy with mild right foraminal stenosis.    At C6-7 there is disc osteophyte indenting the ventral thecal sac and   uncinate spurring with mild foraminal stenosis.    IMPRESSION:    1.  No evidence of acute cervical spine fracture or subluxation.    2.  Diffuse osteopenia and multilevel degenerative changes as described.            < end of copied text >      Assessment / Plan: Patient with neck pain radiating down extremities.  Given history of rheumatoid arthritis for many years would look for antloaxial subluxation with movement and pannus formation in cspine  1. MRI cspine w/o RAF  2. Flexion/ Extension xray of cspine  3. Continue current management  4. Call for abnormal MRI and Xray results

## 2018-08-26 LAB
ANION GAP SERPL CALC-SCNC: 14 MMOL/L — SIGNIFICANT CHANGE UP (ref 7–14)
BASOPHILS # BLD AUTO: 0.05 K/UL — SIGNIFICANT CHANGE UP (ref 0–0.2)
BASOPHILS NFR BLD AUTO: 0.3 % — SIGNIFICANT CHANGE UP (ref 0–1)
BUN SERPL-MCNC: 14 MG/DL — SIGNIFICANT CHANGE UP (ref 10–20)
CALCIUM SERPL-MCNC: 9.1 MG/DL — SIGNIFICANT CHANGE UP (ref 8.5–10.1)
CHLORIDE SERPL-SCNC: 94 MMOL/L — LOW (ref 98–110)
CO2 SERPL-SCNC: 25 MMOL/L — SIGNIFICANT CHANGE UP (ref 17–32)
CREAT SERPL-MCNC: 0.6 MG/DL — LOW (ref 0.7–1.5)
EOSINOPHIL # BLD AUTO: 0.29 K/UL — SIGNIFICANT CHANGE UP (ref 0–0.7)
EOSINOPHIL NFR BLD AUTO: 2 % — SIGNIFICANT CHANGE UP (ref 0–8)
GLUCOSE SERPL-MCNC: 146 MG/DL — HIGH (ref 70–99)
HCT VFR BLD CALC: 46.9 % — SIGNIFICANT CHANGE UP (ref 37–47)
HGB BLD-MCNC: 15.4 G/DL — SIGNIFICANT CHANGE UP (ref 12–16)
IMM GRANULOCYTES NFR BLD AUTO: 0.7 % — HIGH (ref 0.1–0.3)
LYMPHOCYTES # BLD AUTO: 1.06 K/UL — LOW (ref 1.2–3.4)
LYMPHOCYTES # BLD AUTO: 7.2 % — LOW (ref 20.5–51.1)
MCHC RBC-ENTMCNC: 28.2 PG — SIGNIFICANT CHANGE UP (ref 27–31)
MCHC RBC-ENTMCNC: 32.8 G/DL — SIGNIFICANT CHANGE UP (ref 32–37)
MCV RBC AUTO: 85.9 FL — SIGNIFICANT CHANGE UP (ref 81–99)
MONOCYTES # BLD AUTO: 1.56 K/UL — HIGH (ref 0.1–0.6)
MONOCYTES NFR BLD AUTO: 10.6 % — HIGH (ref 1.7–9.3)
NEUTROPHILS # BLD AUTO: 11.64 K/UL — HIGH (ref 1.4–6.5)
NEUTROPHILS NFR BLD AUTO: 79.2 % — HIGH (ref 42.2–75.2)
NRBC # BLD: 0 /100 WBCS — SIGNIFICANT CHANGE UP (ref 0–0)
PLATELET # BLD AUTO: 325 K/UL — SIGNIFICANT CHANGE UP (ref 130–400)
POTASSIUM SERPL-MCNC: 4.4 MMOL/L — SIGNIFICANT CHANGE UP (ref 3.5–5)
POTASSIUM SERPL-SCNC: 4.4 MMOL/L — SIGNIFICANT CHANGE UP (ref 3.5–5)
RBC # BLD: 5.46 M/UL — HIGH (ref 4.2–5.4)
RBC # FLD: 14.2 % — SIGNIFICANT CHANGE UP (ref 11.5–14.5)
SODIUM SERPL-SCNC: 133 MMOL/L — LOW (ref 135–146)
WBC # BLD: 14.7 K/UL — HIGH (ref 4.8–10.8)
WBC # FLD AUTO: 14.7 K/UL — HIGH (ref 4.8–10.8)

## 2018-08-26 RX ORDER — NYSTATIN CREAM 100000 [USP'U]/G
1 CREAM TOPICAL
Qty: 0 | Refills: 0 | Status: DISCONTINUED | OUTPATIENT
Start: 2018-08-26 | End: 2018-08-28

## 2018-08-26 RX ORDER — POTASSIUM CHLORIDE 20 MEQ
20 PACKET (EA) ORAL
Qty: 0 | Refills: 0 | Status: COMPLETED | OUTPATIENT
Start: 2018-08-26 | End: 2018-08-26

## 2018-08-26 RX ADMIN — NYSTATIN CREAM 1 APPLICATION(S): 100000 CREAM TOPICAL at 21:36

## 2018-08-26 RX ADMIN — Medication 1000 UNIT(S): at 12:17

## 2018-08-26 RX ADMIN — Medication 20 MILLIEQUIVALENT(S): at 10:15

## 2018-08-26 RX ADMIN — SIMVASTATIN 20 MILLIGRAM(S): 20 TABLET, FILM COATED ORAL at 21:36

## 2018-08-26 RX ADMIN — Medication 20 MILLIEQUIVALENT(S): at 12:15

## 2018-08-26 RX ADMIN — Medication 20 MILLIEQUIVALENT(S): at 08:43

## 2018-08-26 RX ADMIN — APIXABAN 2.5 MILLIGRAM(S): 2.5 TABLET, FILM COATED ORAL at 10:15

## 2018-08-26 RX ADMIN — Medication 12.5 MILLIGRAM(S): at 21:36

## 2018-08-26 RX ADMIN — AMLODIPINE BESYLATE 2.5 MILLIGRAM(S): 2.5 TABLET ORAL at 06:10

## 2018-08-26 NOTE — PROGRESS NOTE ADULT - SUBJECTIVE AND OBJECTIVE BOX
08-26-18 @ 10:25    KVNG STERN  88y  Female  OOB to chair. Seen with dtr.diana, son Dr. Elliott Stern at bedside.  Looks more alert, comfortable but stiff in neck.     Appreciated Neuro eval.     INTERVAL EVENTS: None    MEDICATIONS  (STANDING):  amLODIPine   Tablet 2.5 milliGRAM(s) Oral daily  apixaban 2.5 milliGRAM(s) Oral every 12 hours  cholecalciferol 1000 Unit(s) Oral daily  glycerin Suppository - Adult 1 Suppository(s) Rectal daily  hydrochlorothiazide 12.5 milliGRAM(s) Oral at bedtime  nystatin Cream 1 Application(s) Topical two times a day  potassium chloride    Tablet ER 20 milliEquivalent(s) Oral every 2 hours  simvastatin 20 milliGRAM(s) Oral at bedtime    MEDICATIONS  (PRN):  ibuprofen  Tablet 600 milliGRAM(s) Oral every 6 hours PRN Mild pain      T(C): 35.9 (08-26-18 @ 07:57), Max: 36.2 (08-26-18 @ 00:08)  HR: 92 (08-26-18 @ 07:57) (92 - 104)  BP: 114/58 (08-26-18 @ 07:57) (114/58 - 126/70)  RR: 18 (08-26-18 @ 07:57) (18 - 18)  SpO2: --  Wt(kg): --Vital Signs Last 24 Hrs  T(C): 35.9 (26 Aug 2018 07:57), Max: 36.2 (26 Aug 2018 00:08)  T(F): 96.6 (26 Aug 2018 07:57), Max: 97.1 (26 Aug 2018 00:08)  HR: 92 (26 Aug 2018 07:57) (92 - 104)  BP: 114/58 (26 Aug 2018 07:57) (114/58 - 126/70)  BP(mean): --  RR: 18 (26 Aug 2018 07:57) (18 - 18)  SpO2: --    PHYSICAL EXAM:  GENERAL: NAD, sharp, vey healthy looking for age  NECK: Stiff. But able to bend forward better. No palop finding  CHEST/LUNG: Clear NVBS  HEART: S1 variable, irreg  ABDOMEN: benign  EXTREMITIES: no CCE  Was able to get up on her feet, w/ holding my hands. Lifts both shoulders up essentially 75-80' above head                          14.7   13.47 )-----------( 283      ( 25 Aug 2018 06:35 )             44.0     08-25    141  |  99  |  13  ----------------------------<  103<H>  3.4<L>   |  26  |  0.5<L>    Ca    8.9      25 Aug 2018 06:35  Mg     2.1     08-25    TPro  6.0  /  Alb  3.6  /  TBili  0.6  /  DBili  x   /  AST  22  /  ALT  19  /  AlkPhos  83  08-25            RADIOLOGY & ADDITIONAL TESTS:      ASSESSMENT / PLAN  :      Stiff neck : CT degen dis. C/w ms stiffness. Rehab, PT. Eval-ed by neuro, MRI advised. Arranged to order with the resident  Back pain & Neck pain : No obvious. will rehab. possible strain   Atr. Fib : Chronic on Eliquis. Rate sub optimal control. CXR ? congestion. Will get Echo to eval, arranged to be ordered. Sees Dr. Cooper.  RA : been on Rx. Clinically well controlled. Sees Dr. Matta.   Dementia : Chronic as per family, no worsening. Stable. Discussed w/ family. She is overall sharp, buyt she admits she tries to block out things.   Hx of Hodgkin lymphoma : been on chemo. Does f/u onco, Dr Sutton.   HTN : stable  Leucocytosis : Had steroid. Will f/u.   Hypokalemia : rpt BMP.

## 2018-08-26 NOTE — PROGRESS NOTE ADULT - ASSESSMENT
88 y f pmh RA (receiving rituxan infusions,  last one ~1 week ptp), afib on eliquis, htn, hodgkin's lymphoma (in remission per pt) p/w ~2d history of acute worsening of chr neck pain.      1. Neck pain  - CT neck - degenerative changes, no fractures or subluxation; CT head - negative  - MRI ordered - f/u results  - Pain control  - PT/Rehab - SNF vs Home VNS (family interested in SNF, f/u with SW)    2. Rheumatoid arthritis - Stable  - c/w with Rituxan  - f/u with Dr. Matta as OP    3. Hypokalemia  - Replaced, f/u AM BMP    4. Mild Leukocytosis  - Received steroids - monitor    5. Afib  - c/w Eliquis    6. HTN  - c/w HCTZ, Amlodipine    7. Hodgkin lymphoma  - Been on chemo  - f/u with Dr. Sutton OP    8. Dementia - stable    DVT PPX  DISPO:  STR in SNF vs home VN per physiatry; / f/u for dispo

## 2018-08-26 NOTE — PROGRESS NOTE ADULT - SUBJECTIVE AND OBJECTIVE BOX
SUBJECTIVE:    Patient is a 88y old Female who presents with a chief complaint of shaky on legs, daughter concerned pt would fall (25 Aug 2018 11:55)    Currently admitted to medicine with the primary diagnosis of Inability to perform activities of daily living     Today is hospital day 2d. This morning she is resting comfortably in bed and reports no new issues or overnight events. Patient is complaining of neck pain.     PAST MEDICAL & SURGICAL HISTORY  Hodgkin lymphoma  RA (rheumatoid arthritis)  Afib  HTN (hypertension)  Carpal tunnel syndrome    SOCIAL HISTORY:  Negative for smoking/alcohol/drug use.     ALLERGIES:  penicillin (Short breath; Rash)    MEDICATIONS:  STANDING MEDICATIONS  amLODIPine   Tablet 2.5 milliGRAM(s) Oral daily  apixaban 2.5 milliGRAM(s) Oral every 12 hours  cholecalciferol 1000 Unit(s) Oral daily  glycerin Suppository - Adult 1 Suppository(s) Rectal daily  hydrochlorothiazide 12.5 milliGRAM(s) Oral at bedtime  nystatin Cream 1 Application(s) Topical two times a day  simvastatin 20 milliGRAM(s) Oral at bedtime    PRN MEDICATIONS  ibuprofen  Tablet 600 milliGRAM(s) Oral every 6 hours PRN    VITALS:   T(F): 96.6  HR: 92  BP: 114/58  RR: 18  SpO2: --    LABS:                        14.7   13.47 )-----------( 283      ( 25 Aug 2018 06:35 )             44.0     08-25    141  |  99  |  13  ----------------------------<  103<H>  3.4<L>   |  26  |  0.5<L>    Ca    8.9      25 Aug 2018 06:35  Mg     2.1     08-25    TPro  6.0  /  Alb  3.6  /  TBili  0.6  /  DBili  x   /  AST  22  /  ALT  19  /  AlkPhos  83  08-25      RADIOLOGY:    < from: CT Cervical Spine No Cont (08.24.18 @ 14:50) >  IMPRESSION:    1.  No evidence of acute cervical spine fracture or subluxation.    2.  Diffuse osteopenia and multilevel degenerative changes as described.    < end of copied text >    < from: CT Abdomen and Pelvis w/ IV Cont (08.23.18 @ 15:26) >  IMPRESSION:     1. No evidence of acute/inflammatory process within the abdomen or pelvis.    2. Sigmoid and descending colonic diverticulosis, without evidence of  diverticulitis.    < end of copied text >    < from: CT Head No Cont (08.23.18 @ 15:26) >  IMPRESSION:    1.  No CT evidence for acute intracranial pathology.      2.  If the patient continues to be symptomatic follow-upMRI of the brain   may be helpful for further evaluation.    < end of copied text >    PHYSICAL EXAM:  GEN: No acute distress  HEENT: Neck limited ROM due to pain  LUNGS: Clear to auscultation bilaterally   HEART: Irregular  ABD: Soft, non-tender, non-distended.  EXT: NC/NC/NE/2+PP/BLANC  NEURO: AAOX3    Intravenous access: PIV

## 2018-08-27 LAB
ANION GAP SERPL CALC-SCNC: 16 MMOL/L — HIGH (ref 7–14)
BASOPHILS # BLD AUTO: 0.05 K/UL — SIGNIFICANT CHANGE UP (ref 0–0.2)
BASOPHILS NFR BLD AUTO: 0.4 % — SIGNIFICANT CHANGE UP (ref 0–1)
BUN SERPL-MCNC: 15 MG/DL — SIGNIFICANT CHANGE UP (ref 10–20)
CALCIUM SERPL-MCNC: 9.3 MG/DL — SIGNIFICANT CHANGE UP (ref 8.5–10.1)
CHLORIDE SERPL-SCNC: 94 MMOL/L — LOW (ref 98–110)
CO2 SERPL-SCNC: 26 MMOL/L — SIGNIFICANT CHANGE UP (ref 17–32)
CREAT SERPL-MCNC: 0.6 MG/DL — LOW (ref 0.7–1.5)
EOSINOPHIL # BLD AUTO: 0.26 K/UL — SIGNIFICANT CHANGE UP (ref 0–0.7)
EOSINOPHIL NFR BLD AUTO: 2.1 % — SIGNIFICANT CHANGE UP (ref 0–8)
GLUCOSE SERPL-MCNC: 111 MG/DL — HIGH (ref 70–99)
HCT VFR BLD CALC: 44.2 % — SIGNIFICANT CHANGE UP (ref 37–47)
HGB BLD-MCNC: 14.8 G/DL — SIGNIFICANT CHANGE UP (ref 12–16)
IMM GRANULOCYTES NFR BLD AUTO: 0.9 % — HIGH (ref 0.1–0.3)
LYMPHOCYTES # BLD AUTO: 1 K/UL — LOW (ref 1.2–3.4)
LYMPHOCYTES # BLD AUTO: 8.3 % — LOW (ref 20.5–51.1)
MCHC RBC-ENTMCNC: 28.6 PG — SIGNIFICANT CHANGE UP (ref 27–31)
MCHC RBC-ENTMCNC: 33.5 G/DL — SIGNIFICANT CHANGE UP (ref 32–37)
MCV RBC AUTO: 85.3 FL — SIGNIFICANT CHANGE UP (ref 81–99)
MONOCYTES # BLD AUTO: 1.35 K/UL — HIGH (ref 0.1–0.6)
MONOCYTES NFR BLD AUTO: 11.1 % — HIGH (ref 1.7–9.3)
NEUTROPHILS # BLD AUTO: 9.34 K/UL — HIGH (ref 1.4–6.5)
NEUTROPHILS NFR BLD AUTO: 77.2 % — HIGH (ref 42.2–75.2)
NRBC # BLD: 0 /100 WBCS — SIGNIFICANT CHANGE UP (ref 0–0)
PLATELET # BLD AUTO: 304 K/UL — SIGNIFICANT CHANGE UP (ref 130–400)
POTASSIUM SERPL-MCNC: 4.3 MMOL/L — SIGNIFICANT CHANGE UP (ref 3.5–5)
POTASSIUM SERPL-SCNC: 4.3 MMOL/L — SIGNIFICANT CHANGE UP (ref 3.5–5)
RBC # BLD: 5.18 M/UL — SIGNIFICANT CHANGE UP (ref 4.2–5.4)
RBC # FLD: 13.9 % — SIGNIFICANT CHANGE UP (ref 11.5–14.5)
SODIUM SERPL-SCNC: 136 MMOL/L — SIGNIFICANT CHANGE UP (ref 135–146)
WBC # BLD: 12.11 K/UL — HIGH (ref 4.8–10.8)
WBC # FLD AUTO: 12.11 K/UL — HIGH (ref 4.8–10.8)

## 2018-08-27 RX ADMIN — NYSTATIN CREAM 1 APPLICATION(S): 100000 CREAM TOPICAL at 17:11

## 2018-08-27 RX ADMIN — SIMVASTATIN 20 MILLIGRAM(S): 20 TABLET, FILM COATED ORAL at 21:04

## 2018-08-27 RX ADMIN — APIXABAN 2.5 MILLIGRAM(S): 2.5 TABLET, FILM COATED ORAL at 17:11

## 2018-08-27 RX ADMIN — AMLODIPINE BESYLATE 2.5 MILLIGRAM(S): 2.5 TABLET ORAL at 05:08

## 2018-08-27 RX ADMIN — Medication 1000 UNIT(S): at 11:29

## 2018-08-27 RX ADMIN — Medication 1 SUPPOSITORY(S): at 11:31

## 2018-08-27 RX ADMIN — Medication 12.5 MILLIGRAM(S): at 21:04

## 2018-08-27 RX ADMIN — NYSTATIN CREAM 1 APPLICATION(S): 100000 CREAM TOPICAL at 05:08

## 2018-08-27 RX ADMIN — APIXABAN 2.5 MILLIGRAM(S): 2.5 TABLET, FILM COATED ORAL at 02:18

## 2018-08-27 NOTE — PROGRESS NOTE ADULT - ASSESSMENT
88 y f pmh RA (receiving rituxan infusions,  last one ~1 week ptp), afib on eliquis, htn, hodgkin's lymphoma (in remission per pt) p/w ~2d history of acute worsening of chr neck pain.      1. Neck pain. Likely due to RA, but concerning in setting of age and acute worsening of pain. Per pt, pain has resolved in interval.   -- Imaging  - CT neck - degenerative changes, no fractures or subluxation; CT head - negative  -- Plan  - MRI ordered, performed - f/u results  - XR C-spine w/ flexion/extension ordered - f/u results  - Pain control  - PT/Rehab - SNF vs Home VNS (family interested in SNF, f/u with SW, PT)    2. Rheumatoid arthritis - Stable  - c/w with Rituxan  - f/u with Dr. Matta as OP    3. Hypokalemia. Repleted and resolved.    4. Mild Leukocytosis  - Received steroids - monitor    5. Afib  - c/w Eliquis    6. HTN  - c/w HCTZ, Amlodipine    7. Hodgkin lymphoma. In remission, per pt.  - Been on chemo  - f/u with Dr. Sutton OP    8. Dementia - stable    DVT PPX: on eliquis  DISPO:  STR in SNF vs home VN per physiatry; / f/u for dispo

## 2018-08-27 NOTE — PHYSICAL THERAPY INITIAL EVALUATION ADULT - GENERAL OBSERVATIONS, REHAB EVAL
1510 - 153 Pt rec semifowler in bed with +bed alarm, in NAD with daughter at b/s. Pt c/o neck pain however pain not radiating at this time. Pt with any LE complaints.

## 2018-08-27 NOTE — PHYSICAL THERAPY INITIAL EVALUATION ADULT - GAIT DISTANCE, PT EVAL
He is spending a quiet afternoon . He appears confused at t   60' x 2; Pt requested to return to room 2* fatigue

## 2018-08-27 NOTE — PROGRESS NOTE ADULT - SUBJECTIVE AND OBJECTIVE BOX
08-27-18 @ 17:41    KVNG REDDY  88y  Female  Comfortable, still some stiffness of her neck, but progressively better. No back sx presently. Wants to go home. No other c/o.    INTERVAL EVENTS: None    MEDICATIONS  (STANDING):  amLODIPine   Tablet 2.5 milliGRAM(s) Oral daily  apixaban 2.5 milliGRAM(s) Oral every 12 hours  cholecalciferol 1000 Unit(s) Oral daily  glycerin Suppository - Adult 1 Suppository(s) Rectal daily  hydrochlorothiazide 12.5 milliGRAM(s) Oral at bedtime  nystatin Cream 1 Application(s) Topical two times a day  simvastatin 20 milliGRAM(s) Oral at bedtime    MEDICATIONS  (PRN):  ibuprofen  Tablet 600 milliGRAM(s) Oral every 6 hours PRN Mild pain      T(C): 35.7 (08-27-18 @ 15:27), Max: 36.9 (08-26-18 @ 23:32)  HR: 104 (08-27-18 @ 15:27) (73 - 104)  BP: 121/67 (08-27-18 @ 15:27) (111/61 - 141/67)  RR: 18 (08-27-18 @ 15:27) (18 - 18)  SpO2: --  Wt(kg): --Vital Signs Last 24 Hrs  T(C): 35.7 (27 Aug 2018 15:27), Max: 36.9 (26 Aug 2018 23:32)  T(F): 96.2 (27 Aug 2018 15:27), Max: 98.5 (26 Aug 2018 23:32)  HR: 104 (27 Aug 2018 15:27) (73 - 104)  BP: 121/67 (27 Aug 2018 15:27) (111/61 - 141/67)  BP(mean): --  RR: 18 (27 Aug 2018 15:27) (18 - 18)  SpO2: --    PHYSICAL EXAM:  GENERAL: NAD  NECK: stiff, but less than admission  CHEST/LUNG: Clear to ausc. Good AE  HEART: S1 variable.   ABDOMEN: benign.   EXTREMITIES: No CCE                            14.8   12.11 )-----------( 304      ( 27 Aug 2018 07:28 )             44.2     08-27    136  |  94<L>  |  15  ----------------------------<  111<H>  4.3   |  26  |  0.6<L>    Ca    9.3      27 Aug 2018 07:28              RADIOLOGY & ADDITIONAL TESTS:      ASSESSMENT / PLAN  :    Stiff neck : CT degen dis. C/w ms stiffness. Rehab, PT. Eval-ed by neuro, MRI advised. Awaiting report. Clinically better.   Back pain & Neck pain : No obvious trauma or other cause. will rehab. possible strain. Analgesic prn.   Atr. Fib : Chronic on Eliquis. Stable, Echo pending. Sees Dr. Cooper.  RA : been on Rx. Clinically well controlled. Sees Dr. Matta.   Dementia : Chronic as per family, no worsening. Stable. Was discussed w/ family. She is overall sharp.   Hx of Hodgkin lymphoma : been on chemo. Does f/u onco, Dr Sutton.   HTN : stable  Leucocytosis : progressively better. Benign.    Hypokalemia : resolved.    PT would like to go home. If w/u benign, d/c planning can be initiated.

## 2018-08-27 NOTE — PHYSICAL THERAPY INITIAL EVALUATION ADULT - ASSISTIVE DEVICE FOR TRANSFER: GAIT, REHAB EVAL
rolling walker/Pt requested to continue using RW this session stating she feels she would be unable to amb without it,

## 2018-08-27 NOTE — PROGRESS NOTE ADULT - SUBJECTIVE AND OBJECTIVE BOX
CHIEF COMPLAINT:  Patient is a 88y old Female who presents with a chief complaint of shaky on legs, daughter concerned pt would fall (25 Aug 2018 11:55)    Currently admitted to medicine with the primary diagnosis of Inability to perform activities of daily living     Today is hospital day 3d. This morning she is resting comfortably in bed and reports no new issues or overnight events.     PAST MEDICAL & SURGICAL HISTORY  Hodgkin lymphoma  RA (rheumatoid arthritis)  Afib  HTN (hypertension)  Carpal tunnel syndrome    SOCIAL HISTORY:  Negative for smoking/alcohol/drug use.     ALLERGIES:  penicillin (Short breath; Rash)    MEDICATIONS:  STANDING MEDICATIONS  amLODIPine   Tablet 2.5 milliGRAM(s) Oral daily  apixaban 2.5 milliGRAM(s) Oral every 12 hours  cholecalciferol 1000 Unit(s) Oral daily  glycerin Suppository - Adult 1 Suppository(s) Rectal daily  hydrochlorothiazide 12.5 milliGRAM(s) Oral at bedtime  nystatin Cream 1 Application(s) Topical two times a day  simvastatin 20 milliGRAM(s) Oral at bedtime    PRN MEDICATIONS  ibuprofen  Tablet 600 milliGRAM(s) Oral every 6 hours PRN    VITALS:   T(F): 97.5  HR: 73  BP: 111/61  RR: 18  SpO2: --    LABS:             14.8   12.11 )-----------( 304      ( 27 Aug 2018 07:28 )             44.2     08-27    136  |  94<L>  |  15  ----------------------------<  111<H>  4.3   |  26  |  0.6<L>    Ca    9.3      27 Aug 2018 07:28    RADIOLOGY:  < from: CT Cervical Spine No Cont (08.24.18 @ 14:50) >  1.  No evidence of acute cervical spine fracture or subluxation.  2.  Diffuse osteopenia and multilevel degenerative changes as described.  < end of copied text >    < from: CT Abdomen and Pelvis w/ IV Cont (08.23.18 @ 15:26) >  1. No evidence of acute/inflammatory process within the abdomen or pelvis.  2. Sigmoid and descending colonic diverticulosis, without evidence of diverticulitis.  < end of copied text >    < from: CT Head No Cont (08.23.18 @ 15:26) >  1.  No CT evidence for acute intracranial pathology.    2.  If the patient continues to be symptomatic follow-up MRI of the brain may be helpful for further evaluation.  < end of copied text >    < from: Xray Chest 1 View AP/PA (08.23.18 @ 13:17) >  Interstitial opacities, which may represent mild edema.   < end of copied text >    PHYSICAL EXAM:  GEN: No acute distress  HEENT: Neck limited ROM due to pain, improving  LUNGS: Clear to auscultation bilaterally   HEART: Irregular  ABD: Soft, non-tender, non-distended.  NEURO: AAOX3

## 2018-08-27 NOTE — PHYSICAL THERAPY INITIAL EVALUATION ADULT - GAIT DEVIATIONS NOTED, PT EVAL
Decreased speed, decreased step height/decreased step length/decreased stride length/decreased jyoti

## 2018-08-28 ENCOUNTER — TRANSCRIPTION ENCOUNTER (OUTPATIENT)
Age: 83
End: 2018-08-28

## 2018-08-28 VITALS
TEMPERATURE: 98 F | HEART RATE: 88 BPM | RESPIRATION RATE: 18 BRPM | SYSTOLIC BLOOD PRESSURE: 127 MMHG | DIASTOLIC BLOOD PRESSURE: 61 MMHG

## 2018-08-28 RX ADMIN — Medication 1000 UNIT(S): at 11:52

## 2018-08-28 RX ADMIN — AMLODIPINE BESYLATE 2.5 MILLIGRAM(S): 2.5 TABLET ORAL at 05:00

## 2018-08-28 RX ADMIN — NYSTATIN CREAM 1 APPLICATION(S): 100000 CREAM TOPICAL at 05:00

## 2018-08-28 RX ADMIN — APIXABAN 2.5 MILLIGRAM(S): 2.5 TABLET, FILM COATED ORAL at 05:00

## 2018-08-28 NOTE — DISCHARGE NOTE ADULT - PATIENT PORTAL LINK FT
You can access the [facility] Patient Portal, offered by [facility], by registering with the following website: [email or web address]

## 2018-08-28 NOTE — DISCHARGE NOTE ADULT - CARE PLAN
Principal Discharge DX:	Neck pain  Goal:	Imaging and Outpatient Follow-up  Assessment and plan of treatment:	Please continue to take medication as prescribed. Arrange follow-up with your primary care provider for within two weeks. If you experience any worsening pain such as difficulty breathing, increasing weakness, syncopal episodes, chest pain, spreading pains, fever, or chills, please contact emergency personnel as soon as possible.  Secondary Diagnosis:	Inability to perform activities of daily living  Goal:	Rehabilitation Therapy and Outpatient Follow-up  Assessment and plan of treatment:	Please continue to take medication as prescribed. Arrange follow-up with your primary care provider. Principal Discharge DX:	Neck pain  Goal:	Imaging and Outpatient Follow-up  Assessment and plan of treatment:	Imaging shows mild spinal canal stenosis and degenerative changes. Please continue to take medication as prescribed. Arrange follow-up with your primary care provider and a neurologist (see below) for within two weeks. If you experience any worsening pain such as difficulty breathing, increasing weakness, syncopal episodes, chest pain, spreading pains, fever, or chills, please contact emergency personnel as soon as possible.  Secondary Diagnosis:	Inability to perform activities of daily living  Goal:	Rehabilitation Therapy and Outpatient Follow-up  Assessment and plan of treatment:	Please continue to take medication as prescribed. Arrange follow-up with your primary care provider.

## 2018-08-28 NOTE — DISCHARGE NOTE ADULT - MEDICATION SUMMARY - MEDICATIONS TO TAKE
I will START or STAY ON the medications listed below when I get home from the hospital:    Eliquis 2.5 mg oral tablet  -- 1 tab(s) by mouth 2 times a day  -- Indication: For Afib    simvastatin 20 mg oral tablet  -- orally once a day  -- Indication: For DLD    amLODIPine 2.5 mg oral tablet  -- 1 tab(s) by mouth once a day  -- Indication: For HTN (hypertension)    hydroCHLOROthiazide 12.5 mg oral tablet  -- 1 tab(s) by mouth once a day  -- Indication: For HTN (hypertension)    FLUTICASONE PROPIONATE 50 MCG/ACT SUSP  -- Indication: For Breathing

## 2018-08-28 NOTE — DISCHARGE NOTE ADULT - CARE PROVIDER_API CALL
Kell Acuña), Internal Medicine  [address] [place] [address]  Phone: [phone]  Fax: [phone] Austin Boykin), EEGEpilepsy; Neurology  [facility]  [address] [place] [address]  Phone: [phone]  Fax: [phone]

## 2018-08-28 NOTE — PROGRESS NOTE ADULT - SUBJECTIVE AND OBJECTIVE BOX
KVNG REDDY  88y  Female      SUBJECTIVE:  c/o wants to go home    Progress Note:      REVIEW OF SYSTEMS:    T(C): 36.2 (08-28-18 @ 07:32), Max: 36.5 (08-27-18 @ 23:55)  HR: 102 (08-28-18 @ 07:32) (102 - 107)  BP: 115/62 (08-28-18 @ 07:32) (115/62 - 127/59)  RR: 18 (08-28-18 @ 07:32) (18 - 18)  SpO2: --  Wt(kg): --Vital Signs Last 24 Hrs  T(C): 36.2 (28 Aug 2018 07:32), Max: 36.5 (27 Aug 2018 23:55)  T(F): 97.2 (28 Aug 2018 07:32), Max: 97.7 (27 Aug 2018 23:55)  HR: 102 (28 Aug 2018 07:32) (102 - 107)  BP: 115/62 (28 Aug 2018 07:32) (115/62 - 127/59)  BP(mean): --  RR: 18 (28 Aug 2018 07:32) (18 - 18)  SpO2: --    PHYSICAL EXAM:  lungs-clear  cor irreg,nl S1 & S2  abd-soft  neuro -alert and oriented  motor intact  reflexes-normal bilateral  LABS:                        14.8   12.11 )-----------( 304      ( 27 Aug 2018 07:28 )             44.2   08-27    136  |  94<L>  |  15  ----------------------------<  111<H>  4.3   |  26  |  0.6<L>    Ca    9.3      27 Aug 2018 07:28        RADIOLOGY:  < from: MR Cervical Spine No Cont (08.26.18 @ 18:55) >    EXAM:  MR SPINE CERVICAL            PROCEDURE DATE:  08/26/2018            INTERPRETATION:  Clinical history/Reason for exam: Neck pain.    Technique:  Sagittal and axial T1 and T2-weighted images, sagittal proton   density, inversion recovery images and axial gradient echo MR images of   the cervical spine were obtained.     Comparison:  CT of the cervical spine 8/24/2018.    Findings:    There is straightening of the normal cervical curvature. Vertebral bodies   are unremarkable in height. There is mild anterolisthesis C3 on C4. No   additional significant subluxation is seen. The cervical spinal cord is   normal in signal.    No significant bone marrow signal abnormality is seen.    There is mild intervertebral disc space narrowing at C4-C5, C5-C6 and   C6-C7.    At C2-C3, there is a small central disc protrusion along with ligamentum   flavum buckling resulting in moderate spinal canal stenosis. There is   mild bilateral foraminal stenosis secondary to facet arthropathy.    At C3-C4, there is a disc bulge resulting in mild spinal canal stenosis.   There is moderate bilateral foraminal stenosis secondary to facet   arthropathy.    At C4-C5, there is a disc bulge resulting in mild spinal canal stenosis.   There is moderate bilateral foraminal stenosis secondary to uncovertebral   joint spurring and facet arthropathy.    At C5-C6, there is a disc osteophyte complex with mild spinal canal   stenosis. There is mild right foraminal stenosis secondary to   uncovertebral joint spurring.    At C6-C7, there is a disc bulge resulting in mild spinal canal stenosis.   Mild bilateral foraminal stenosis seen secondary to uncovertebral joint   spurring.    At C7-T1 there is a disc bulge. No significant spinal canal or foraminal   stenosis is seen.    Impression:    Mild to moderate multilevel degenerative changes as described above.                  TRESSA KIRBY M.D., ATTENDING RADIOLOGIST  This document has been electronically signed. Aug 27 2018  1:22PM              < end of copied text >      IMPRESSION:  acute neck pain-improved  deg. cervical disc disease  moderate spinal stenosis at C2-C3  hodkin lymphoma  leukocytosis  improving ,due to steroids  htn-nl  chronic a. fib  RA        PLAN:  neuro f/u regarding  spinal stenosis  daily pt  family wants rehab at snf  d/c planning  I SPENT 30 MINS. EVALUATING/EXAMINING PATIENT  AND COORDINATING CARE WITH RESIDENT AND NURSE

## 2018-08-28 NOTE — PROGRESS NOTE ADULT - SUBJECTIVE AND OBJECTIVE BOX
CHIEF COMPLAINT:    Patient is a 88y old Female who presents with a chief complaint of shaky on legs, daughter concerned pt would fall (28 Aug 2018 13:53)    Currently admitted to medicine with the primary diagnosis of Neck pain     Today is hospital day 4d. This morning she is resting comfortably in bed and reports no new issues or overnight events.     PAST MEDICAL & SURGICAL HISTORY  Hodgkin lymphoma  RA (rheumatoid arthritis)  Afib  HTN (hypertension)  Carpal tunnel syndrome    SOCIAL HISTORY:  Negative for smoking/alcohol/drug use.     ALLERGIES:  penicillin (Short breath; Rash)    MEDICATIONS:  STANDING MEDICATIONS  amLODIPine   Tablet 2.5 milliGRAM(s) Oral daily  apixaban 2.5 milliGRAM(s) Oral every 12 hours  cholecalciferol 1000 Unit(s) Oral daily  glycerin Suppository - Adult 1 Suppository(s) Rectal daily  hydrochlorothiazide 12.5 milliGRAM(s) Oral at bedtime  nystatin Cream 1 Application(s) Topical two times a day  simvastatin 20 milliGRAM(s) Oral at bedtime    PRN MEDICATIONS  ibuprofen  Tablet 600 milliGRAM(s) Oral every 6 hours PRN    VITALS:   T(F): 97.2  HR: 102  BP: 115/62  RR: 18  SpO2: --    LABS:                        14.8   12.11 )-----------( 304      ( 27 Aug 2018 07:28 )             44.2     08-27    136  |  94<L>  |  15  ----------------------------<  111<H>  4.3   |  26  |  0.6<L>    Ca    9.3      27 Aug 2018 07:28    RADIOLOGY:  < from: CT Cervical Spine No Cont (08.24.18 @ 14:50) >  1.  No evidence of acute cervical spine fracture or subluxation.  2.  Diffuse osteopenia and multilevel degenerative changes as described.  < end of copied text >    < from: CT Abdomen and Pelvis w/ IV Cont (08.23.18 @ 15:26) >  1. No evidence of acute/inflammatory process within the abdomen or pelvis.  2. Sigmoid and descending colonic diverticulosis, without evidence of diverticulitis.  < end of copied text >    < from: CT Head No Cont (08.23.18 @ 15:26) >  1.  No CT evidence for acute intracranial pathology.    2.  If the patient continues to be symptomatic follow-up MRI of the brain may be helpful for further evaluation.  < end of copied text >    < from: Xray Chest 1 View AP/PA (08.23.18 @ 13:17) >  Interstitial opacities, which may represent mild edema.   < end of copied text >    < from: Xray Cervical Spine Comp Obl, Fle+Ext Min 6V (08.27.18 @ 15:09) >  Mild dynamic instability at C2-C3, as above, which can represent ligamentous laxity.    Moderate/severe degenerative disc disease, most significant at C4-5 and C5-6.  < end of copied text >    < from: MR Cervical Spine No Cont (08.26.18 @ 18:55) >  There is mild intervertebral disc space narrowing at C4-C5, C5-C6 and C6-C7.    At C2-C3, there is a small central disc protrusion along with ligamentum flavum buckling resulting in moderate spinal canal stenosis. There is mild bilateral foraminal stenosis secondary to facet arthropathy.    At C3-C4, there is a disc bulge resulting in mild spinal canal stenosis.   There is moderate bilateral foraminal stenosis secondary to facet arthropathy.    At C4-C5, there is a disc bulge resulting in mild spinal canal stenosis.   There is moderate bilateral foraminal stenosis secondary to uncovertebral joint spurring and facet arthropathy.    At C5-C6, there is a disc osteophyte complex with mild spinal canal stenosis. There is mild right foraminal stenosis secondary to uncovertebral joint spurring.    At C6-C7, there is a disc bulge resulting in mild spinal canal stenosis.   Mild bilateral foraminal stenosis seen secondary to uncovertebral joint spurring.    At C7-T1 there is a disc bulge. No significant spinal canal or foraminal stenosis is seen.    Impression:  Mild to moderate multilevel degenerative changes as described above.  < end of copied text >    PHYSICAL EXAM:  GEN: No acute distress  HEENT: Neck stiff, but w/o TTP, pain on manipulation  LUNGS: Clear to auscultation bilaterally   HEART: Irregular  ABD: Soft, non-tender, non-distended.  NEURO: AAOX3

## 2018-08-28 NOTE — DISCHARGE NOTE ADULT - HOSPITAL COURSE
88 y f pmh RA (receiving rituxan infusions,  last one ~1 week ptp), afib on eliquis, htn, hodgkin's lymphoma (in remission per pt) p/w ~2d history of acute worsening of chr neck pain.      1. Neck pain. Likely due to RA vs MSK strain, but concerning in setting of age and acute worsening of pain. Per pt, pain has resolved in interval.   -- Imaging  - CT neck - degenerative changes, no fractures or subluxation; CT head - negative  - MRI c-spine - mild to moderate multilevel degenerative changes as described above.  - XR C-spine w/ flexion/extension ordered - Moderate/severe degenerative disc disease, most significant at C4-5 and C5-6.  -- Plan  OP follow-up, Rehab

## 2018-08-28 NOTE — DISCHARGE NOTE ADULT - PLAN OF CARE
Imaging and Outpatient Follow-up Please continue to take medication as prescribed. Arrange follow-up with your primary care provider for within two weeks. If you experience any worsening pain such as difficulty breathing, increasing weakness, syncopal episodes, chest pain, spreading pains, fever, or chills, please contact emergency personnel as soon as possible. Rehabilitation Therapy and Outpatient Follow-up Please continue to take medication as prescribed. Arrange follow-up with your primary care provider. Imaging shows mild spinal canal stenosis and degenerative changes. Please continue to take medication as prescribed. Arrange follow-up with your primary care provider and a neurologist (see below) for within two weeks. If you experience any worsening pain such as difficulty breathing, increasing weakness, syncopal episodes, chest pain, spreading pains, fever, or chills, please contact emergency personnel as soon as possible.

## 2018-08-28 NOTE — PROGRESS NOTE ADULT - ASSESSMENT
88 y f pmh RA (receiving rituxan infusions,  last one ~1 week ptp), afib on eliquis, htn, hodgkin's lymphoma (in remission per pt) p/w ~2d history of acute worsening of chr neck pain.      1. Neck pain. Likely due to RA, but concerning in setting of age and acute worsening of pain. Per pt, pain has resolved in interval.   -- Imaging  - CT neck - degenerative changes, no fractures or subluxation; CT head - negative  - MRI c-spine - mild to moderate multilevel degenerative changes as described above.  - XR C-spine w/ flexion/extension ordered - Moderate/severe degenerative disc disease, most significant at C4-5 and C5-6.  -- Plan  - f/u neurology concerning spinal stenosis  - Pain control  - PT/Rehab - SNF vs Home VNS (family interested in SNF, f/u with SW, PT)    2. Rheumatoid arthritis - Stable  - c/w with Rituxan  - f/u with Dr. Matta as OP    3. Hypokalemia. Repleted and resolved.    4. Mild Leukocytosis  - Received steroids - monitor    5. Afib  - c/w Eliquis    6. HTN  - c/w HCTZ, Amlodipine    7. Hodgkin lymphoma. In remission, per pt.  - Been on chemo  - f/u with Dr. Sutton OP    8. Dementia - stable    DVT PPX: on eliquis  DISPO:  STR in SNF vs home VN per physiatry; / f/u for dispo

## 2018-08-29 ENCOUNTER — OUTPATIENT (OUTPATIENT)
Dept: OUTPATIENT SERVICES | Facility: HOSPITAL | Age: 83
LOS: 1 days | Discharge: HOME | End: 2018-08-29

## 2018-08-29 DIAGNOSIS — G56.00 CARPAL TUNNEL SYNDROME, UNSPECIFIED UPPER LIMB: Chronic | ICD-10-CM

## 2018-08-29 DIAGNOSIS — R79.9 ABNORMAL FINDING OF BLOOD CHEMISTRY, UNSPECIFIED: ICD-10-CM

## 2018-08-29 PROBLEM — M06.9 RHEUMATOID ARTHRITIS, UNSPECIFIED: Chronic | Status: ACTIVE | Noted: 2018-08-24

## 2018-08-29 PROBLEM — I10 ESSENTIAL (PRIMARY) HYPERTENSION: Chronic | Status: ACTIVE | Noted: 2018-08-23

## 2018-08-29 PROBLEM — I48.91 UNSPECIFIED ATRIAL FIBRILLATION: Chronic | Status: ACTIVE | Noted: 2018-08-23

## 2018-09-06 DIAGNOSIS — E87.6 HYPOKALEMIA: ICD-10-CM

## 2018-09-06 DIAGNOSIS — M48.02 SPINAL STENOSIS, CERVICAL REGION: ICD-10-CM

## 2018-09-06 DIAGNOSIS — I10 ESSENTIAL (PRIMARY) HYPERTENSION: ICD-10-CM

## 2018-09-06 DIAGNOSIS — Z85.71 PERSONAL HISTORY OF HODGKIN LYMPHOMA: ICD-10-CM

## 2018-09-06 DIAGNOSIS — M54.2 CERVICALGIA: ICD-10-CM

## 2018-09-06 DIAGNOSIS — R26.9 UNSPECIFIED ABNORMALITIES OF GAIT AND MOBILITY: ICD-10-CM

## 2018-09-06 DIAGNOSIS — K59.00 CONSTIPATION, UNSPECIFIED: ICD-10-CM

## 2018-09-06 DIAGNOSIS — M06.9 RHEUMATOID ARTHRITIS, UNSPECIFIED: ICD-10-CM

## 2018-09-06 DIAGNOSIS — I48.2 CHRONIC ATRIAL FIBRILLATION: ICD-10-CM

## 2018-09-06 DIAGNOSIS — M50.31 OTHER CERVICAL DISC DEGENERATION, HIGH CERVICAL REGION: ICD-10-CM

## 2018-09-06 DIAGNOSIS — Z79.01 LONG TERM (CURRENT) USE OF ANTICOAGULANTS: ICD-10-CM

## 2018-09-12 ENCOUNTER — OUTPATIENT (OUTPATIENT)
Dept: OUTPATIENT SERVICES | Facility: HOSPITAL | Age: 83
LOS: 1 days | Discharge: HOME | End: 2018-09-12

## 2018-09-12 DIAGNOSIS — G56.00 CARPAL TUNNEL SYNDROME, UNSPECIFIED UPPER LIMB: Chronic | ICD-10-CM

## 2018-09-13 DIAGNOSIS — N39.0 URINARY TRACT INFECTION, SITE NOT SPECIFIED: ICD-10-CM

## 2018-11-15 ENCOUNTER — OUTPATIENT (OUTPATIENT)
Dept: OUTPATIENT SERVICES | Facility: HOSPITAL | Age: 83
LOS: 1 days | Discharge: HOME | End: 2018-11-15

## 2018-11-15 DIAGNOSIS — M50.01 CERVICAL DISC DISORDER WITH MYELOPATHY, HIGH CERVICAL REGION: ICD-10-CM

## 2018-11-15 DIAGNOSIS — G56.00 CARPAL TUNNEL SYNDROME, UNSPECIFIED UPPER LIMB: Chronic | ICD-10-CM

## 2018-11-15 DIAGNOSIS — R26.9 UNSPECIFIED ABNORMALITIES OF GAIT AND MOBILITY: ICD-10-CM

## 2019-04-03 PROBLEM — Z00.00 ENCOUNTER FOR PREVENTIVE HEALTH EXAMINATION: Status: ACTIVE | Noted: 2019-04-03

## 2019-06-12 ENCOUNTER — EMERGENCY (EMERGENCY)
Facility: HOSPITAL | Age: 84
LOS: 0 days | Discharge: HOME | End: 2019-06-12
Attending: EMERGENCY MEDICINE | Admitting: EMERGENCY MEDICINE
Payer: MEDICARE

## 2019-06-12 VITALS
RESPIRATION RATE: 18 BRPM | SYSTOLIC BLOOD PRESSURE: 113 MMHG | HEART RATE: 108 BPM | OXYGEN SATURATION: 94 % | TEMPERATURE: 97 F | DIASTOLIC BLOOD PRESSURE: 77 MMHG

## 2019-06-12 VITALS
OXYGEN SATURATION: 94 % | TEMPERATURE: 97 F | DIASTOLIC BLOOD PRESSURE: 68 MMHG | SYSTOLIC BLOOD PRESSURE: 115 MMHG | RESPIRATION RATE: 18 BRPM | HEART RATE: 83 BPM

## 2019-06-12 DIAGNOSIS — Z79.899 OTHER LONG TERM (CURRENT) DRUG THERAPY: ICD-10-CM

## 2019-06-12 DIAGNOSIS — I48.91 UNSPECIFIED ATRIAL FIBRILLATION: ICD-10-CM

## 2019-06-12 DIAGNOSIS — S20.211A CONTUSION OF RIGHT FRONT WALL OF THORAX, INITIAL ENCOUNTER: ICD-10-CM

## 2019-06-12 DIAGNOSIS — S70.01XA CONTUSION OF RIGHT HIP, INITIAL ENCOUNTER: ICD-10-CM
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
## 2019-06-12 DIAGNOSIS — G56.00 CARPAL TUNNEL SYNDROME, UNSPECIFIED UPPER LIMB: Chronic | ICD-10-CM
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
## 2019-06-12 DIAGNOSIS — I10 ESSENTIAL (PRIMARY) HYPERTENSION: ICD-10-CM

## 2019-06-12 DIAGNOSIS — Z88.0 ALLERGY STATUS TO PENICILLIN: ICD-10-CM

## 2019-06-12 DIAGNOSIS — Z79.01 LONG TERM (CURRENT) USE OF ANTICOAGULANTS: ICD-10-CM
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
## 2019-06-12 DIAGNOSIS — M25.551 PAIN IN RIGHT HIP: ICD-10-CM
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
## 2019-06-12 LAB
ANION GAP SERPL CALC-SCNC: 14 MMOL/L — SIGNIFICANT CHANGE UP (ref 7–14)
APTT BLD: 45.3 SEC — HIGH (ref 27–39.2)
BASOPHILS # BLD AUTO: 0.04 K/UL — SIGNIFICANT CHANGE UP (ref 0–0.2)
BASOPHILS NFR BLD AUTO: 0.4 % — SIGNIFICANT CHANGE UP (ref 0–1)
BUN SERPL-MCNC: 17 MG/DL — SIGNIFICANT CHANGE UP (ref 10–20)
CALCIUM SERPL-MCNC: 9.1 MG/DL — SIGNIFICANT CHANGE UP (ref 8.5–10.1)
CHLORIDE SERPL-SCNC: 100 MMOL/L — SIGNIFICANT CHANGE UP (ref 98–110)
CO2 SERPL-SCNC: 26 MMOL/L — SIGNIFICANT CHANGE UP (ref 17–32)
CREAT SERPL-MCNC: 0.7 MG/DL — SIGNIFICANT CHANGE UP (ref 0.7–1.5)
EOSINOPHIL # BLD AUTO: 0.15 K/UL — SIGNIFICANT CHANGE UP (ref 0–0.7)
EOSINOPHIL NFR BLD AUTO: 1.6 % — SIGNIFICANT CHANGE UP (ref 0–8)
GLUCOSE SERPL-MCNC: 102 MG/DL — HIGH (ref 70–99)
HCT VFR BLD CALC: 44.3 % — SIGNIFICANT CHANGE UP (ref 37–47)
HGB BLD-MCNC: 14.4 G/DL — SIGNIFICANT CHANGE UP (ref 12–16)
IMM GRANULOCYTES NFR BLD AUTO: 0.4 % — HIGH (ref 0.1–0.3)
INR BLD: 1.42 RATIO — HIGH (ref 0.65–1.3)
LYMPHOCYTES # BLD AUTO: 0.99 K/UL — LOW (ref 1.2–3.4)
LYMPHOCYTES # BLD AUTO: 10.7 % — LOW (ref 20.5–51.1)
MCHC RBC-ENTMCNC: 28.3 PG — SIGNIFICANT CHANGE UP (ref 27–31)
MCHC RBC-ENTMCNC: 32.5 G/DL — SIGNIFICANT CHANGE UP (ref 32–37)
MCV RBC AUTO: 87 FL — SIGNIFICANT CHANGE UP (ref 81–99)
MONOCYTES # BLD AUTO: 1.14 K/UL — HIGH (ref 0.1–0.6)
MONOCYTES NFR BLD AUTO: 12.4 % — HIGH (ref 1.7–9.3)
NEUTROPHILS # BLD AUTO: 6.87 K/UL — HIGH (ref 1.4–6.5)
NEUTROPHILS NFR BLD AUTO: 74.5 % — SIGNIFICANT CHANGE UP (ref 42.2–75.2)
NRBC # BLD: 0 /100 WBCS — SIGNIFICANT CHANGE UP (ref 0–0)
PLATELET # BLD AUTO: 294 K/UL — SIGNIFICANT CHANGE UP (ref 130–400)
POTASSIUM SERPL-MCNC: 3.7 MMOL/L — SIGNIFICANT CHANGE UP (ref 3.5–5)
POTASSIUM SERPL-SCNC: 3.7 MMOL/L — SIGNIFICANT CHANGE UP (ref 3.5–5)
PROTHROM AB SERPL-ACNC: 16.3 SEC — HIGH (ref 9.95–12.87)
RBC # BLD: 5.09 M/UL — SIGNIFICANT CHANGE UP (ref 4.2–5.4)
RBC # FLD: 14.5 % — SIGNIFICANT CHANGE UP (ref 11.5–14.5)
SODIUM SERPL-SCNC: 140 MMOL/L — SIGNIFICANT CHANGE UP (ref 135–146)
WBC # BLD: 9.23 K/UL — SIGNIFICANT CHANGE UP (ref 4.8–10.8)
WBC # FLD AUTO: 9.23 K/UL — SIGNIFICANT CHANGE UP (ref 4.8–10.8)

## 2019-06-12 PROCEDURE — 71045 X-RAY EXAM CHEST 1 VIEW: CPT | Mod: 26

## 2019-06-12 PROCEDURE — 73502 X-RAY EXAM HIP UNI 2-3 VIEWS: CPT | Mod: 26,RT

## 2019-06-12 PROCEDURE — 74177 CT ABD & PELVIS W/CONTRAST: CPT | Mod: 26

## 2019-06-12 PROCEDURE — 99284 EMERGENCY DEPT VISIT MOD MDM: CPT

## 2019-06-12 PROCEDURE — 70450 CT HEAD/BRAIN W/O DYE: CPT | Mod: 26

## 2019-06-12 PROCEDURE — 71260 CT THORAX DX C+: CPT | Mod: 26

## 2019-06-12 RX ORDER — ACETAMINOPHEN 500 MG
650 TABLET ORAL ONCE
Refills: 0 | Status: COMPLETED | OUTPATIENT
Start: 2019-06-12 | End: 2019-06-12

## 2019-06-12 RX ORDER — SODIUM CHLORIDE 9 MG/ML
500 INJECTION INTRAMUSCULAR; INTRAVENOUS; SUBCUTANEOUS ONCE
Refills: 0 | Status: COMPLETED | OUTPATIENT
Start: 2019-06-12 | End: 2019-06-12

## 2019-06-12 RX ADMIN — SODIUM CHLORIDE 500 MILLILITER(S): 9 INJECTION INTRAMUSCULAR; INTRAVENOUS; SUBCUTANEOUS at 14:16

## 2019-06-12 NOTE — ED PROVIDER NOTE - OBJECTIVE STATEMENT
this is 89 yo female who presents to ed with daughter after trip and fall at home. patient is on blood thinner. patient states she got up early this morning and while walking to bathroom she tripped and fell. patient states she was able to get up and walk . patient admits that she is having some pain in right hip. patient denies any LOC. patient states she does not think she hit her head. patient daughter noticed that patient has bruising on right hip and brought her to ed for further evaluation.

## 2019-06-12 NOTE — ED PROVIDER NOTE - PHYSICAL EXAMINATION
--EXAM--  VITAL SIGNS: I have reviewed vs documented at present.  CONSTITUTIONAL: Well-developed; well-nourished; in no acute distress.   SKIN: bruising to right hip region   HEAD: Normocephalic; atraumatic.  EYES: PERRL, EOM intact; conjunctiva and sclera clear. No nystagmus.    CARD: S1, S2, Regular rate and rhythm. chest there is small area of ecchymosis to right lower ribs minimal tenderness on palpation   RESP: No wheezes, rales or rhonchi.  ABD: Normal bowel sounds; soft; non-distended; non-tender.  EXT: right hip there Is tenderness over area of ecchymosis. full rom with minimal pain.     NEURO: Alert, oriented, grossly unremarkable. Strength 5/5 in all extremities. Sensation intact throughout.  PSYCH: Cooperative, appropriate.

## 2019-06-12 NOTE — ED PROVIDER NOTE - NS ED ROS FT
Review of Systems:  	•	CONSTITUTIONAL - no fever, no diaphoresis, no chills  	•	SKIN -bruising to right hip  	•	HEMATOLOGIC - no bleeding, no bruising  	•	EYES - no eye pain, no blurry vision  	•	ENT - no change in hearing, no sore throat, no ear pain or tinnitus  	•	RESPIRATORY - no shortness of breath, no cough  	•	CARDIAC - no chest pain, no palpitations  	  	  	•	MUSCULOSKELETAL -right hip pain t, no swelling, no redness  	•

## 2019-06-27 ENCOUNTER — APPOINTMENT (OUTPATIENT)
Dept: CARDIOLOGY | Facility: CLINIC | Age: 84
End: 2019-06-27

## 2019-07-24 ENCOUNTER — APPOINTMENT (OUTPATIENT)
Dept: CARDIOLOGY | Facility: CLINIC | Age: 84
End: 2019-07-24
Payer: MEDICARE

## 2019-07-24 PROCEDURE — 93000 ELECTROCARDIOGRAM COMPLETE: CPT

## 2019-07-24 PROCEDURE — 99214 OFFICE O/P EST MOD 30 MIN: CPT

## 2019-09-23 ENCOUNTER — EMERGENCY (EMERGENCY)
Facility: HOSPITAL | Age: 84
LOS: 0 days | Discharge: HOME | End: 2019-09-23
Attending: EMERGENCY MEDICINE | Admitting: EMERGENCY MEDICINE
Payer: MEDICARE

## 2019-09-23 VITALS
OXYGEN SATURATION: 93 % | TEMPERATURE: 98 F | DIASTOLIC BLOOD PRESSURE: 63 MMHG | WEIGHT: 160.94 LBS | HEART RATE: 93 BPM | SYSTOLIC BLOOD PRESSURE: 130 MMHG | RESPIRATION RATE: 16 BRPM

## 2019-09-23 VITALS — SYSTOLIC BLOOD PRESSURE: 128 MMHG | DIASTOLIC BLOOD PRESSURE: 78 MMHG | HEART RATE: 82 BPM | RESPIRATION RATE: 18 BRPM

## 2019-09-23 DIAGNOSIS — G56.00 CARPAL TUNNEL SYNDROME, UNSPECIFIED UPPER LIMB: Chronic | ICD-10-CM

## 2019-09-23 DIAGNOSIS — Z88.0 ALLERGY STATUS TO PENICILLIN: ICD-10-CM

## 2019-09-23 DIAGNOSIS — R04.0 EPISTAXIS: ICD-10-CM

## 2019-09-23 PROCEDURE — 30903 CONTROL OF NOSEBLEED: CPT | Mod: GC

## 2019-09-23 PROCEDURE — 99283 EMERGENCY DEPT VISIT LOW MDM: CPT | Mod: 25,GC

## 2019-09-23 NOTE — ED PROVIDER NOTE - OBJECTIVE STATEMENT
89yF pmhx HTN, Afib, rheumatoid arthritis, hodgkin lymphoma accompanied by children c/o nosebleed starting this morning and persisting, says she saw minor bleeding last night but it resolved on its own; denies trauma to nose, fever/chills, HA. Pt reports taking blood thinners for her Afib.

## 2019-09-23 NOTE — ED PROVIDER NOTE - CARE PROVIDER_API CALL
Inez Sol)  Otolaryngology  [address] [place] [address], [address] [place] [address]  Phone: [phone]  Fax: [phone]  Follow Up Time: 1-3 Days

## 2019-09-23 NOTE — ED ADULT NURSE NOTE - CHIEF COMPLAINT QUOTE
pt c/o nosebleed R nare x 1 day on Eliquis. pt nose began to bleed when she was having a bowel movement. pt stated she put pressure on nose but every time it clots it begins to bleed again. Pt endorsed to Dr Jin.

## 2019-09-23 NOTE — ED PROVIDER NOTE - NSFOLLOWUPINSTRUCTIONS_ED_ALL_ED_FT
Please follow up with Dr Sol or the [facility] within 48 hours.     Epistaxis (nosebleed)    Nosebleeds are common and can be caused by many conditions, such as injury, infections, dry mucous membranes or dry climate, medicines, nose picking, and home heating and cooling systems. Try controlling your nosebleed by pinching your nose continuously for at least 10 minutes. Avoid lying down while you are having a nosebleed. Sit up and lean forward. Avoid blowing or sniffing your nose for a number of hours after having a nosebleed. Resume your normal activities as you are able, but avoid straining, lifting, or bending at the waist for several days. Maintain humidity in your home by using less air conditioning or by using a humidifier.     If your nose was packed by your health care provider, try to maintain the pack inside of your nose until your health care provider removes it. If a balloon catheter was used to pack your nose, do not cut or remove it unless your health care provider has instructed you to do that.     Aspirin and blood thinners make bleeding more likely. If you are prescribed these medicines and you suffer from nosebleeds, ask your health care provider if you should stop taking the medicines or adjust the dose. Do not stop medicines unless directed by your health care provider     SEEK IMMEDIATE MEDICAL CARE IF YOU HAVE THE FOLLOWING SYMPTOMS: nosebleed lasting longer than 20 minutes, unusual bleeding from or bruising on other parts of your body, dizziness or lightheadedness, nosebleed occurring after a head injury, or fever.

## 2019-09-23 NOTE — ED PROCEDURE NOTE - CPROC ED POST PROC CARE GUIDE1
Verbal/written post procedure instructions were given to patient/caregiver./ENT info given/Instructed patient/caregiver regarding signs and symptoms of infection.

## 2019-09-23 NOTE — ED PROVIDER NOTE - ATTENDING CONTRIBUTION TO CARE
I personally evaluated the patient. I reviewed the Resident’s or Physician Assistant’s note (as assigned above), and agree with the findings and plan except as documented in my note.    88yo F with Afib on Eliquis, p/w epistaxis intermittently since this morning, unable to stop for past hour. Denies trauma. Has h/o epistaxis in past. Denies lightheadedness, SOB, constitutional symptoms.     Vital signs reviewed  GENERAL: Patient nontoxic appearing, NAD  HEAD: NCAT  EYES: Anicteric  ENT: MMM. Dried blood in left nares. Currently minimal active bleeding. Small clot in posterior pharynx.   RESPIRATORY: Normal respiratory effort. CTA B/L. No wheezing, rales, rhonchi  CARDIOVASCULAR: Regular rate and rhythm  MUSCULOSKELETAL/EXTREMITIES: Brisk cap refill. Equal radial pulses.   SKIN:  Warm and dry  NEURO: AAOx3. No gross FND. I personally evaluated the patient. I reviewed the Resident’s or Physician Assistant’s note (as assigned above), and agree with the findings and plan except as documented in my note.    89yo F with Afib on Eliquis, p/w epistaxis from right nare intermittently since this morning, unable to stop for past hour. Slow oozing. Denies trauma. Has h/o epistaxis in past. Denies lightheadedness, SOB, constitutional symptoms.     Vital signs reviewed  GENERAL: Patient nontoxic appearing, NAD  HEAD: NCAT  EYES: Anicteric  ENT: MMM. Dried blood in right nare. Currently minimal active bleeding. Small clot in posterior pharynx.   RESPIRATORY: Normal respiratory effort. CTA B/L. No wheezing, rales, rhonchi  CARDIOVASCULAR: Regular rate and rhythm  MUSCULOSKELETAL/EXTREMITIES: Brisk cap refill. Equal radial pulses.   SKIN:  Warm and dry  NEURO: AAOx3. No gross FND.

## 2019-09-23 NOTE — ED PROCEDURE NOTE - ATTENDING CONTRIBUTION TO CARE
I was present for and supervised the key/critical aspects of the procedures performed during the care of the patient.     Right nare anterior rhino rocket placed.

## 2019-09-23 NOTE — ED PROVIDER NOTE - NSFOLLOWUPCLINICS_GEN_ALL_ED_FT
[facility]  ENT  [address], [address]nd [place] [address]  Phone: [phone]  Fax:   Follow Up Time: 1-3 Days

## 2019-09-23 NOTE — ED PROVIDER NOTE - NO PERTINENT FAMILY HISTORY
7/4: CTH with SAH, CTA with CAROLINA aneurysm. 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 Daily TCDs. Vasospasm on TCD-increased fluids; follow TCDs. Resumed home inhaler, started protonix for GERD and remelteon QHS for sleep, simethicone prn for gas/indigestion. Switched NS to LR fluids  7/10: gave 1 dose soumedrol 150 mg x once. F/u TCDs   <<----- Click to add NO pertinent Family History

## 2019-09-23 NOTE — ED ADULT TRIAGE NOTE - CHIEF COMPLAINT QUOTE
pt c/o nosebleed R nare x 1 day on Eliquis. pt nose began to bleed when she was having a bowel movement. pt stated she put pressure on nose but every time it clots it begins to bleed again. pt c/o nosebleed R nare x 1 day on Eliquis. pt nose began to bleed when she was having a bowel movement. pt stated she put pressure on nose but every time it clots it begins to bleed again. Pt endorsed to Dr Jin.

## 2019-09-23 NOTE — ED PROVIDER NOTE - PHYSICAL EXAMINATION
GEN: alert, NAD, answers appropriately  HEAD:  normocephalic, atraumatic  EYES:  conjunctivae without injection, drainage or discharge  ENMT:  +fresh and clotted blood in RT nares, dried blood surrounding b/l nares; mouth moist without ulcerations or lesions; +blood in posterior oropharynx  NECK:  supple, no masses, no significant lymphadenopathy  CARDIAC:  regular rate and rhythm, normal S1 and S2, no murmurs, rubs or gallops  RESP:  respiratory rate and effort appear normal for age; lungs are clear to auscultation bilaterally; no rales or wheezes  ABDOMEN:  soft, nontender, nondistended, no masses, no organomegaly  MUSCULOSKELETAL/NEURO:  normal movement, normal tone  SKIN:  normal skin color for age and race, well-perfused; warm and dry

## 2019-09-23 NOTE — ED PROVIDER NOTE - PATIENT PORTAL LINK FT
You can access the FollowMyHealth Patient Portal offered by [facility] by registering at the following website: [email or web address]. By joining [employer]’s FollowMyHealth portal, you will also be able to view your health information using other applications (apps) compatible with our system.

## 2019-10-03 NOTE — PATIENT PROFILE ADULT. - ABILITY TO HEAR (WITH HEARING AID OR HEARING APPLIANCE IF NORMALLY USED):
Adequate: hears normal conversation without difficulty Patient Name: Abraham Menjivar  Procedure Date: 10/11/2018 8:09 AM  MRN: [id number]  Account Number: [id number]  YOB: 1952  Admit Type: Ambulatory  Age: 66  Gender: Male  Attending MD: Pauly Woodard MD  Grafts or Implants: None  Blood Administered: None  Procedure:            Colonoscopy  Indications:          High risk colon cancer surveillance: Personal history                        of adenoma less than 10 mm in size, High risk colon                        cancer surveillance: Ulcerative pancolitis of 8 (or                        more) years duration, last colonoscopy done 9/2017:                        inactive colitis, adenomas removed  Providers:            Pauly Woodard MD  Referring MD:         Evelin Montana  Attending Participation:       I personally performed the entire procedure.  Sedation:             Monitored Anesthesia Care  Procedure:       Pre-Anesthesia Assessment:       - Prior to the procedure, a History and Physical was performed, and       patient medications and allergies were reviewed. The patient is       competent. The risks and benefits of the procedure and the sedation       options and risks were discussed with the patient. All questions were       answered and informed consent was obtained. Patient identification and       proposed procedure were verified by the physician, the nurse and the       anesthesiologist in the endoscopy suite. Mental Status Examination:       alert and oriented. Airway Examination: normal oropharyngeal airway and       neck mobility. Respiratory Examination: clear to auscultation. CV       Examination: normal. Prophylactic Antibiotics: The patient does not       require prophylactic antibiotics. Prior Anticoagulants: The patient has       taken aspirin. ASA Grade Assessment: II - A patient with mild systemic       disease. After reviewing the risks and benefits, the patient was deemed       in satisfactory condition to undergo the  procedure. The anesthesia plan       was to use monitored anesthesia care (MAC). Immediately prior to       administration of medications, the patient was re-assessed for adequacy       to receive sedatives. The heart rate, respiratory rate, oxygen       saturations, blood pressure, adequacy of pulmonary ventilation, and       response to care were monitored throughout the procedure. The physical       status of the patient was re-assessed after the procedure.       A History and Physical was performed prior to the procedure. Patient       medications and allergies were reviewed. Informed consent was obtained       from the patient including discussion of the risks, benefits,       alternatives to the procedure (including the potentially life       threatening risk of bleeding, perforation, missed lesions and sedation).       Questions were answered. A time out was completed. Patient       identification and proposed procedure were verified. The patient was       deemed in satisfactory condition to undergo the procedure. The heart       rate, respiratory rate, oxygen saturations, blood pressure and response       to sedation were monitored throughout the procedure.       The endoscope was passed under direct vision. The Colonoscope was       introduced through the anus and advanced to the cecum, identified by       appendiceal orifice and ileocecal valve. The scope was withdrawn and       retroflexion was performed in the rectum. The physical status of the       patient was re-assessed after the procedure. The colonoscopy was       performed with ease. The patient tolerated the procedure well. The       quality of the bowel preparation was adequate.  Scope Withdrawal Time 0 hours 17 minutes 31 seconds  Scope In: 8:13:18 AM  Scope Out: 8:34:33 AM  Findings:       The perianal and digital rectal examinations were normal.       A 2 mm polyp was found in the ascending colon. The polyp was sessile.       The polyp was  removed with a cold biopsy forceps. The polyp was removed       with a hot snare. Resection and retrieval were complete. Verification of       patient identification for the specimen was done.       Multiple polyps vs pseudopolyps were found in the transverse colon. The       polyps were 1 mm to up to 1 cm in size. These mostly appeared like       pseudopolyps and were circumferential and diffuse in the mid-distal       transverse colon. Some were removed with a cold biopsy forceps       (approximately 10) to exclude adenomas. Resection and retrieval were       complete. Verification of patient identification for the specimen was       done. There was tattoo seen in the transverse colon at approximately 80       cm from the anal verge without polyp seen in the scar. There were also       pseudopolyps seen in the descending colon without active surrounding       colitis.       Non-bleeding internal hemorrhoids were found during retroflexion. The       hemorrhoids were Grade II (internal hemorrhoids that prolapse but reduce       spontaneously).       Multiple biopsies were taken every 10 cm with a cold forceps from the       entire colon for dysplasia surveillance and ulcerative colitis       surveillance. These biopsy specimens were sent to Pathology.       Verification of patient identification for the specimen was done.       The exam was otherwise without abnormality on direct and retroflexion       views. No evidence of proctitis.  Impression:       - One 2 mm polyp in the ascending colon, removed with a cold biopsy       forceps. Resected and retrieved.       - Multiple diminuitive to 1 cm polyps vs pseudopolyps were seen in the       transverse colon, approximately 10 were removed with a cold biopsy       forceps to exclude adenomas.These mostly appeared like pseudopolyps and       not adenomas.       - Pseudopolyps were also seen in the descending colon that were under 1       cm in size and appeared  fingerlike.       - Tattoo was seen in the transverse colon at approximately 80 cm from       the anal verge without residual polyp.       - Non-bleeding internal hemorrhoids.       - The examination was otherwise normal on direct and retroflexion views.       - Biopsies for surveillance were taken from the entire colon. No       proctitis.  Recommendation:       - Patient has a contact number available for emergencies. The signs and       symptoms of potential delayed complications were discussed with the       patient. Return to normal activities tomorrow. Written discharge       instructions were provided to the patient.       - High fiber diet.       - Continue present medications.       - Await pathology results.       - Repeat colonoscopy in 1 year for surveillance, unless indicated sooner.  Complications:        No immediate complications.  Estimated Blood Loss: Estimated blood loss: none.  Pauly Woodard MD  10/11/2018 8:53:02 AM  This report has been signed electronically by the above.  Number of Addenda: 0       [id number] [place] [address]

## 2020-02-04 ENCOUNTER — APPOINTMENT (OUTPATIENT)
Dept: CARDIOLOGY | Facility: CLINIC | Age: 85
End: 2020-02-04
Payer: MEDICARE

## 2020-02-04 PROCEDURE — 99214 OFFICE O/P EST MOD 30 MIN: CPT

## 2020-02-04 PROCEDURE — 93000 ELECTROCARDIOGRAM COMPLETE: CPT

## 2020-07-22 ENCOUNTER — RECORD ABSTRACTING (OUTPATIENT)
Age: 85
End: 2020-07-22

## 2020-07-22 DIAGNOSIS — I35.9 NONRHEUMATIC AORTIC VALVE DISORDER, UNSPECIFIED: ICD-10-CM

## 2020-07-22 RX ORDER — MULTIVIT-MIN/FOLIC/VIT K/LYCOP 400-300MCG
25 MCG TABLET ORAL DAILY
Refills: 0 | Status: ACTIVE | COMMUNITY

## 2020-07-22 RX ORDER — AMLODIPINE BESYLATE 2.5 MG/1
2.5 TABLET ORAL DAILY
Refills: 0 | Status: ACTIVE | COMMUNITY

## 2020-07-22 RX ORDER — SIMVASTATIN 20 MG/1
20 TABLET, FILM COATED ORAL DAILY
Refills: 0 | Status: ACTIVE | COMMUNITY

## 2020-08-21 ENCOUNTER — APPOINTMENT (OUTPATIENT)
Dept: CARDIOLOGY | Facility: CLINIC | Age: 85
End: 2020-08-21

## 2020-10-10 NOTE — ED CDU PROVIDER INITIAL DAY NOTE - CROS ED GI ALL NEG
Last seen 9/24/2020  Lasted refilled 9/4/2020  Please advise on refill   Not on protocol    
Routing to [facility].   
negative...

## 2020-10-26 ENCOUNTER — APPOINTMENT (OUTPATIENT)
Dept: CARDIOLOGY | Facility: CLINIC | Age: 85
End: 2020-10-26
Payer: MEDICARE

## 2020-10-26 ENCOUNTER — APPOINTMENT (OUTPATIENT)
Dept: CARDIOLOGY | Facility: CLINIC | Age: 85
End: 2020-10-26

## 2020-10-26 VITALS — WEIGHT: 255.8 LBS | TEMPERATURE: 98.9 F | BODY MASS INDEX: 45.32 KG/M2 | HEIGHT: 63 IN

## 2020-10-26 VITALS
HEIGHT: 60 IN | SYSTOLIC BLOOD PRESSURE: 120 MMHG | DIASTOLIC BLOOD PRESSURE: 80 MMHG | HEART RATE: 90 BPM | BODY MASS INDEX: 30.63 KG/M2 | WEIGHT: 156 LBS

## 2020-10-26 VITALS — SYSTOLIC BLOOD PRESSURE: 140 MMHG | HEART RATE: 71 BPM | DIASTOLIC BLOOD PRESSURE: 75 MMHG

## 2020-10-26 DIAGNOSIS — I34.9 NONRHEUMATIC MITRAL VALVE DISORDER, UNSPECIFIED: ICD-10-CM

## 2020-10-26 DIAGNOSIS — I48.91 UNSPECIFIED ATRIAL FIBRILLATION: ICD-10-CM

## 2020-10-26 DIAGNOSIS — I25.119 ATHEROSCLEROTIC HEART DISEASE OF NATIVE CORONARY ARTERY WITH UNSPECIFIED ANGINA PECTORIS: ICD-10-CM

## 2020-10-26 DIAGNOSIS — E78.5 HYPERLIPIDEMIA, UNSPECIFIED: ICD-10-CM

## 2020-10-26 DIAGNOSIS — I11.9 HYPERTENSIVE HEART DISEASE W/OUT HEART FAILURE: ICD-10-CM

## 2020-10-26 PROCEDURE — 99214 OFFICE O/P EST MOD 30 MIN: CPT

## 2020-10-26 PROCEDURE — 93000 ELECTROCARDIOGRAM COMPLETE: CPT

## 2020-10-26 NOTE — PHYSICAL EXAM
[Normal Appearance] : normal appearance [General Appearance - Well Nourished] : well nourished [General Appearance - In No Acute Distress] : no acute distress [Normal Conjunctiva] : the conjunctiva exhibited no abnormalities [Normal Oropharynx] : normal oropharynx [Normal Jugular Venous V Waves Present] : normal jugular venous V waves present [Respiration, Rhythm And Depth] : normal respiratory rhythm and effort [Auscultation Breath Sounds / Voice Sounds] : lungs were clear to auscultation bilaterally [Heart Sounds] : normal S1 and S2 [Arterial Pulses Normal] : the arterial pulses were normal [Abdomen Soft] : soft [Abdomen Tenderness] : non-tender [FreeTextEntry1] : Patient walks with assistance [Nail Clubbing] : no clubbing of the fingernails [Cyanosis, Localized] : no localized cyanosis [Skin Turgor] : normal skin turgor [] : no rash [Oriented To Time, Place, And Person] : oriented to person, place, and time

## 2020-10-26 NOTE — HISTORY OF PRESENT ILLNESS
[FreeTextEntry1] : Hypertensive heart disease\par Valvular insufficiency MR/TR/AI/PI\par Hyperlipidemia\par Atrial fibrillation

## 2020-10-26 NOTE — DISCUSSION/SUMMARY
[FreeTextEntry1] : Patient was instructed to target their T. Cholesterol to less than 200 mg/dl and LDL cholesterol to less than 100 mg/dl.\par Exercise and weight loss was advised.\par Maintain cardiac medications. \par Patient was advised to repeat a BMP, CBC , fasting lipid profile and hepatic panel.\par RV in 6 months.

## 2020-12-17 ENCOUNTER — RX RENEWAL (OUTPATIENT)
Age: 85
End: 2020-12-17

## 2020-12-17 RX ORDER — HYDROCHLOROTHIAZIDE 12.5 MG/1
12.5 CAPSULE ORAL
Qty: 90 | Refills: 3 | Status: ACTIVE | COMMUNITY
Start: 2020-12-17 | End: 1900-01-01

## 2021-03-07 ENCOUNTER — RX RENEWAL (OUTPATIENT)
Age: 86
End: 2021-03-07

## 2021-03-07 RX ORDER — AMLODIPINE BESYLATE 2.5 MG/1
2.5 TABLET ORAL
Qty: 90 | Refills: 3 | Status: ACTIVE | COMMUNITY
Start: 2021-03-07 | End: 1900-01-01

## 2021-03-07 RX ORDER — APIXABAN 2.5 MG/1
2.5 TABLET, FILM COATED ORAL
Qty: 180 | Refills: 3 | Status: ACTIVE | COMMUNITY
Start: 2021-03-07 | End: 1900-01-01

## 2021-03-07 RX ORDER — SIMVASTATIN 20 MG/1
20 TABLET, FILM COATED ORAL
Qty: 90 | Refills: 3 | Status: ACTIVE | COMMUNITY
Start: 2021-03-07 | End: 1900-01-01

## 2021-08-16 NOTE — ED ADULT NURSE NOTE - PRO INTERPRETER NEED 2
Health Maintenance Due   Topic Date Due   • Diabetes Eye Exam  Never done   • Diabetes Foot Exam  Never done   • Shingles Vaccine (1 of 2) Never done   • Breast Cancer Screening  11/18/2018   • Osteoporosis Screening  Never done   • Medicare Wellness Visit  05/20/2021       Patient is due for topics as listed above but is not proceeding with Immunization(s) Shingles, Diabetes Eye Exam, Diabetes Foot Exam, Mammogram and Osteoporosis screening at this time.  Appt scheduled to perform MWV (Medicare Wellness Visit).         English

## 2021-09-07 NOTE — ED CDU PROVIDER INITIAL DAY NOTE - NS ED MD PROGRESS NOTE ADD
[facility] Hospitalist Inpatient Progress Note    Subjective  Pt has been up to chair but not ambulated halls yet. She denies N/V/D, CP, or SOB.Started eating p.o.  Urine output in the last 24 hours about 3870 mL.      Review of Systems  Constitutional: Negative for fever, chills, change in appetite or fatigue.  Skin: Negative for rash or wounds.  Respiratory: Negative cough, wheezing or shortness of breath.  Cardiovascular: Negative for chest pain, chest pressure, palpitations or diaphoresis.  Gastrointestinal: Negative for nausea, vomiting, diarrhea, abdominal pain, black or tarry stools.  Neurologic:  Negative for change in sensory or motor function.  Negative for headache, change in gait, vertigo, vision or speech.       Allergies  ALLERGIES:  Aspirin buffered and Penicillins    Home Medications  Medications Prior to Admission   Medication Sig Dispense Refill   • clobetasol (TEMOVATE) 0.05 % ointment      • gentamicin (GARAMYCIN) 0.1 % cream APPLY TO EXIT SITE DAILY AS DIRECTED     • triamcinolone (ARISTOCORT) 0.1 % ointment      • cinacalcet (Sensipar) 60 MG tablet Take 60 mg by mouth daily.      • betamethasone dipropionate (DIPROSONE) 0.05 % ointment Apply 1 application topically as needed.   3   • cloNIDine (CATAPRES) 0.3 MG tablet Take 0.3 mg by mouth every 12 hours.     • carvedilol (COREG) 25 MG tablet Take 25 mg by mouth 2 times daily (with meals).     • amLODIPine (NORVASC) 10 MG tablet TAKE 1 TABLET DAILY     • hydrALAZINE (APRESOLINE) 100 MG tablet TAKE ONE TABLET BY MOUTH EVERY 8 HOURS         Inpatient Medications  • valGANciclovir  900 mg Oral Daily with breakfast   • NIFEdipine XL  60 mg Oral Daily   • mycophenolate  360 mg Oral BIDTX   • minoxidil  5 mg Oral BID   • doxazosin  4 mg Oral QAM   • famotidine  20 mg Oral BID   • cloNIDine  0.3 mg Oral 3 times per day   • sulfamethoxazole-trimethoprim  1 tablet Oral Once per day on Mon Wed Fri   • docusate sodium  100 mg Oral BID   •  furosemide  40 mg Intravenous 4 times per day   • sodium chloride (PF)  2 mL Intracatheter 2 times per day   • polyethylene glycol  17 g Oral Daily   • hydrALAZINE  100 mg Oral 3 times per day   • carvedilol  25 mg Oral BID   • amLODIPine  10 mg Oral Daily     • sodium chloride 0.9% infusion     • sodium chloride 0.9% infusion     • niCARdipine (CARDENE) 40 mg/200 mL in NaCl infusion 5 mg/hr (09/07/21 0800)   • sodium chloride 0.9% infusion        HYDROcodone-acetaminophen, sodium chloride, sodium chloride, calcium carbonate, ondansetron (ZOFRAN) parenteral, polyethylene glycol, diphenhydrAMINE, sodium chloride, nalbuphine, naLOXone, hydrALAZINE, metoPROLOL     Last Recorded Vitals  Vitals with min/max:  Temp:  [98.1 °F (36.7 °C)-98.6 °F (37 °C)] 98.4 °F (36.9 °C)  Heart Rate:  [100-115] 102  Resp:  [13-19] 16  BP: (135-174)/(73-98) 159/97    Body mass index is 21.47 kg/m².       Physical Exam  General:  In no apparent distress.    Head:  No signs of head trauma.  Eyes:  Pupils are equal.  Extraocular motions intact.    Ears:  Hearing grossly intact.  Mouth:  Oropharynx is normal.   Neck:  No adenopathy, no JVD.     Chest:  Chest with clear breath sounds bilaterally.  No wheezes, rales, or rhonchi.    Cardiac:  Regular rate and rhythm.  S1 and S2, without murmurs, gallops, or rubs.  Abdomen:  Soft, without detectable tenderness.  No sign of distention.  No   rebound or guarding, and no masses palpated.   Bowel Sounds normal.  Musculoskeletal:  Good range of motion of all major joints. Extremities without clubbing, cyanosis or edema.  Vascular:  No Edema.  Peripheral pulses normal and equal in all extremities.    Neuro:  Alert and oriented x 3.  No focal sensory or strength deficits.   Speech normal.  Follows commands.  Psychiatric:  Mood normal.  Skin:  No rash or lesions.     Labs   Recent Labs     09/05/21  1231 09/05/21  2101 09/06/21  0413 09/06/21  1408 09/07/21  0315   SODIUM 138 139 138 139 140   POTASSIUM  2.8* 3.0* 3.2* 3.7 3.6   CO2 25 21 21 24 25   ANIONGAP 12 14 13 12 13   GLUCOSE 131* 115* 124* 121* 130*   BUN 28* 27* 26* 28* 28*   CREATININE 1.45* 1.20* 1.10* 1.09* 1.14*   BCRAT 19 23 24 26* 25   CALCIUM 9.0 8.6 8.7 9.5 9.2   BILIRUBIN 0.3 0.4  --   --   --    AST 16 15  --   --   --    GPT 15 11  --   --   --    ALKPT 103 92  --   --   --    ALBUMIN 2.5* 2.4*  --   --   --    GLOB 4.5* 4.1*  --   --   --    AGR 0.6* 0.6*  --   --   --         Recent Labs     21  0413 21  0315 21  0950   WBC 6.8 5.3 5.8   RBC 2.72* 2.49* 2.43*   HGB 8.1* 7.3* 7.1*   HCT 24.6* 22.4* 22.0*    189 172   MCV 90.4 90.0 90.5   MCH 29.8 29.3 29.2   MCHC 32.9 32.6 32.3   NRBCRE 0 0 0         Recent Labs   Lab 21  1552   RAPDTR <0.02        No results for input(s): INR, PT, PTT in the last 72 hours.    No results available in last 24 hours    Microbiology Results     None            Imaging         LAST ECHO/ECHO STRESS:  No valid procedures specified.    LAST EKG:  Encounter Date: 21   Electrocardiogram 12-Lead   Result Value    Ventricular Rate EKG/Min (BPM) 75    Atrial Rate (BPM) 75    DE-Interval (MSEC) 174    QRS-Interval (MSEC) 100    QT-Interval (MSEC) 448    QTc 500    P Axis (Degrees) 63    R Axis (Degrees) 23    T Axis (Degrees) 113    REPORT TEXT      Normal sinus rhythm  T wave abnormality, consider lateral ischemia  Prolonged QT  Abnormal ECG  No previous ECGs available  Confirmed by SAM HERNANDEZ MD ([id number]) on 9/3/2021 2:10:34 PM             Intake/Output Summary (Last 24 hours) at 2021 1502  Last data filed at 2021 0700  Gross per 24 hour   Intake 2365.64 ml   Output 2670 ml   Net -304.36 ml          Assessment/Plan     35 year old female w/ PMH ESRD 2/2 HTN on PD x 7 yrs who is now s/p  donor kidney transplant on 9/3/21    Esrd sp renal transplant 9/3  - transplant surgery and renal service dr lucero following  - Anti rejection med per transplant renal team  - cr  trending down 12 > 7 to 5.8 to 3.7 to 1.45 > 1.09 >1.14  - on ivf and lasix     Hypertension  - bp better but still elevated    On hydralazine, coreg and norvasc with addition of clonidine 9/4    Minoxidil added 9/5 and up to 5 mg today    Still on cardene gtt, wean as bp allow  -Patient reports having resistant hypertension prior to transplant as well.     Anemia of Chronic ds  -.  Hemoglobin today noted at 7.3.  Continue to monitor and will transfuse as needed.    Acute postop pain  - on dilaudid pca pump    Immunosuppression: Induced with Thymoglobulin and Solu-Medrol.  Continue Solu-Medrol taper per protocol  -  Myfortic was started however was held.  Will resume at 360 mg every 12.    Nutrition  - on advanced diet    Heartburn  - prn sabrina    Electrolytes: Monitor closely , on IV Lasix.  Potassium being replaced.    ID prophylaxis: CMV D+/R+, EBV D+/R+. started per protocol        DVT Prophylaxis        Code Status  Code Status Information     Code Status    Not on file           Communication  RN, patient    Greater than 50% of the time spent reviewing the patient records, coordinating patient care plan and discussing the above care plan  with the patient.      Ayana Hamlin MD  9/7/2021   Add Progress Note...

## 2021-09-21 ENCOUNTER — INPATIENT (INPATIENT)
Facility: HOSPITAL | Age: 86
LOS: 5 days | Discharge: NURSING, REHAB OR LONG-TERM CARE | End: 2021-09-27
Attending: INTERNAL MEDICINE | Admitting: INTERNAL MEDICINE
Payer: MEDICARE

## 2021-09-21 VITALS
HEIGHT: 62 IN | SYSTOLIC BLOOD PRESSURE: 98 MMHG | WEIGHT: 134.92 LBS | RESPIRATION RATE: 22 BRPM | OXYGEN SATURATION: 93 % | DIASTOLIC BLOOD PRESSURE: 58 MMHG | HEART RATE: 108 BPM

## 2021-09-21 DIAGNOSIS — J84.10 PULMONARY FIBROSIS, UNSPECIFIED: ICD-10-CM

## 2021-09-21 DIAGNOSIS — I10 ESSENTIAL (PRIMARY) HYPERTENSION: ICD-10-CM

## 2021-09-21 DIAGNOSIS — R77.8 OTHER SPECIFIED ABNORMALITIES OF PLASMA PROTEINS: ICD-10-CM

## 2021-09-21 DIAGNOSIS — E78.5 HYPERLIPIDEMIA, UNSPECIFIED: ICD-10-CM

## 2021-09-21 DIAGNOSIS — E87.70 FLUID OVERLOAD, UNSPECIFIED: ICD-10-CM

## 2021-09-21 DIAGNOSIS — G56.00 CARPAL TUNNEL SYNDROME, UNSPECIFIED UPPER LIMB: Chronic | ICD-10-CM

## 2021-09-21 DIAGNOSIS — I48.91 UNSPECIFIED ATRIAL FIBRILLATION: ICD-10-CM

## 2021-09-21 DIAGNOSIS — J18.9 PNEUMONIA, UNSPECIFIED ORGANISM: ICD-10-CM

## 2021-09-21 DIAGNOSIS — M06.9 RHEUMATOID ARTHRITIS, UNSPECIFIED: ICD-10-CM

## 2021-09-21 LAB
ALBUMIN SERPL ELPH-MCNC: 3.5 G/DL — SIGNIFICANT CHANGE UP (ref 3.5–5.2)
ALP SERPL-CCNC: 99 U/L — SIGNIFICANT CHANGE UP (ref 30–115)
ALT FLD-CCNC: 11 U/L — SIGNIFICANT CHANGE UP (ref 0–41)
ANION GAP SERPL CALC-SCNC: 12 MMOL/L — SIGNIFICANT CHANGE UP (ref 7–14)
APPEARANCE UR: CLEAR — SIGNIFICANT CHANGE UP
APTT BLD: 38.8 SEC — SIGNIFICANT CHANGE UP (ref 27–39.2)
AST SERPL-CCNC: 30 U/L — SIGNIFICANT CHANGE UP (ref 0–41)
BACTERIA # UR AUTO: ABNORMAL
BASE EXCESS BLDV CALC-SCNC: 3.2 MMOL/L — HIGH (ref -2–3)
BASOPHILS # BLD AUTO: 0.06 K/UL — SIGNIFICANT CHANGE UP (ref 0–0.2)
BASOPHILS NFR BLD AUTO: 0.4 % — SIGNIFICANT CHANGE UP (ref 0–1)
BILIRUB SERPL-MCNC: 0.7 MG/DL — SIGNIFICANT CHANGE UP (ref 0.2–1.2)
BILIRUB UR-MCNC: NEGATIVE — SIGNIFICANT CHANGE UP
BUN SERPL-MCNC: 23 MG/DL — HIGH (ref 10–20)
CA-I SERPL-SCNC: 1.15 MMOL/L — SIGNIFICANT CHANGE UP (ref 1.15–1.33)
CALCIUM SERPL-MCNC: 8.6 MG/DL — SIGNIFICANT CHANGE UP (ref 8.5–10.1)
CHLORIDE SERPL-SCNC: 101 MMOL/L — SIGNIFICANT CHANGE UP (ref 98–110)
CK MB CFR SERPL CALC: 2.6 NG/ML — SIGNIFICANT CHANGE UP (ref 0.6–6.3)
CK SERPL-CCNC: 64 U/L — SIGNIFICANT CHANGE UP (ref 0–225)
CO2 SERPL-SCNC: 25 MMOL/L — SIGNIFICANT CHANGE UP (ref 17–32)
COLOR SPEC: YELLOW — SIGNIFICANT CHANGE UP
CREAT SERPL-MCNC: 0.7 MG/DL — SIGNIFICANT CHANGE UP (ref 0.7–1.5)
D DIMER BLD IA.RAPID-MCNC: 185 NG/ML DDU — SIGNIFICANT CHANGE UP (ref 0–230)
DIFF PNL FLD: NEGATIVE — SIGNIFICANT CHANGE UP
EOSINOPHIL # BLD AUTO: 0.06 K/UL — SIGNIFICANT CHANGE UP (ref 0–0.7)
EOSINOPHIL NFR BLD AUTO: 0.4 % — SIGNIFICANT CHANGE UP (ref 0–8)
EPI CELLS # UR: ABNORMAL /HPF
GAS PNL BLDV: 135 MMOL/L — LOW (ref 136–145)
GAS PNL BLDV: SIGNIFICANT CHANGE UP
GLUCOSE SERPL-MCNC: 91 MG/DL — SIGNIFICANT CHANGE UP (ref 70–99)
GLUCOSE UR QL: NEGATIVE MG/DL — SIGNIFICANT CHANGE UP
HCO3 BLDV-SCNC: 30 MMOL/L — HIGH (ref 22–29)
HCT VFR BLD CALC: 41.4 % — SIGNIFICANT CHANGE UP (ref 37–47)
HCT VFR BLDA CALC: 41 % — SIGNIFICANT CHANGE UP (ref 34.5–46.5)
HGB BLD CALC-MCNC: 13.5 G/DL — SIGNIFICANT CHANGE UP (ref 11.7–16.1)
HGB BLD-MCNC: 13.1 G/DL — SIGNIFICANT CHANGE UP (ref 12–16)
IMM GRANULOCYTES NFR BLD AUTO: 0.7 % — HIGH (ref 0.1–0.3)
INR BLD: 1.32 RATIO — HIGH (ref 0.65–1.3)
KETONES UR-MCNC: NEGATIVE — SIGNIFICANT CHANGE UP
LACTATE BLDV-MCNC: 2 MMOL/L — SIGNIFICANT CHANGE UP (ref 0.5–2)
LEUKOCYTE ESTERASE UR-ACNC: NEGATIVE — SIGNIFICANT CHANGE UP
LYMPHOCYTES # BLD AUTO: 0.76 K/UL — LOW (ref 1.2–3.4)
LYMPHOCYTES # BLD AUTO: 4.5 % — LOW (ref 20.5–51.1)
MCHC RBC-ENTMCNC: 27.2 PG — SIGNIFICANT CHANGE UP (ref 27–31)
MCHC RBC-ENTMCNC: 31.6 G/DL — LOW (ref 32–37)
MCV RBC AUTO: 86.1 FL — SIGNIFICANT CHANGE UP (ref 81–99)
MONOCYTES # BLD AUTO: 1.29 K/UL — HIGH (ref 0.1–0.6)
MONOCYTES NFR BLD AUTO: 7.7 % — SIGNIFICANT CHANGE UP (ref 1.7–9.3)
NEUTROPHILS # BLD AUTO: 14.56 K/UL — HIGH (ref 1.4–6.5)
NEUTROPHILS NFR BLD AUTO: 86.3 % — HIGH (ref 42.2–75.2)
NITRITE UR-MCNC: NEGATIVE — SIGNIFICANT CHANGE UP
NRBC # BLD: 0 /100 WBCS — SIGNIFICANT CHANGE UP (ref 0–0)
NT-PROBNP SERPL-SCNC: 3366 PG/ML — HIGH (ref 0–300)
PCO2 BLDV: 51 MMHG — HIGH (ref 39–42)
PH BLDV: 7.37 — SIGNIFICANT CHANGE UP (ref 7.32–7.43)
PH UR: 6 — SIGNIFICANT CHANGE UP (ref 5–8)
PLATELET # BLD AUTO: 280 K/UL — SIGNIFICANT CHANGE UP (ref 130–400)
PO2 BLDV: 32 MMHG — SIGNIFICANT CHANGE UP
POTASSIUM BLDV-SCNC: 4 MMOL/L — SIGNIFICANT CHANGE UP (ref 3.5–5.1)
POTASSIUM SERPL-MCNC: 4.3 MMOL/L — SIGNIFICANT CHANGE UP (ref 3.5–5)
POTASSIUM SERPL-MCNC: 5.7 MMOL/L — HIGH (ref 3.5–5)
POTASSIUM SERPL-SCNC: 4.3 MMOL/L — SIGNIFICANT CHANGE UP (ref 3.5–5)
POTASSIUM SERPL-SCNC: 5.7 MMOL/L — HIGH (ref 3.5–5)
PROT SERPL-MCNC: 5.9 G/DL — LOW (ref 6–8)
PROT UR-MCNC: 30 MG/DL
PROTHROM AB SERPL-ACNC: 15.1 SEC — HIGH (ref 9.95–12.87)
RBC # BLD: 4.81 M/UL — SIGNIFICANT CHANGE UP (ref 4.2–5.4)
RBC # FLD: 16.8 % — HIGH (ref 11.5–14.5)
RBC CASTS # UR COMP ASSIST: SIGNIFICANT CHANGE UP /HPF
SAO2 % BLDV: 50.4 % — SIGNIFICANT CHANGE UP
SARS-COV-2 RNA SPEC QL NAA+PROBE: SIGNIFICANT CHANGE UP
SODIUM SERPL-SCNC: 138 MMOL/L — SIGNIFICANT CHANGE UP (ref 135–146)
SP GR SPEC: 1.02 — SIGNIFICANT CHANGE UP (ref 1.01–1.03)
TROPONIN T SERPL-MCNC: 0.02 NG/ML — HIGH
TROPONIN T SERPL-MCNC: <0.01 NG/ML — SIGNIFICANT CHANGE UP
UROBILINOGEN FLD QL: 1 MG/DL
WBC # BLD: 16.84 K/UL — HIGH (ref 4.8–10.8)
WBC # FLD AUTO: 16.84 K/UL — HIGH (ref 4.8–10.8)
WBC UR QL: SIGNIFICANT CHANGE UP /HPF

## 2021-09-21 PROCEDURE — 93970 EXTREMITY STUDY: CPT | Mod: 26

## 2021-09-21 PROCEDURE — 99497 ADVNCD CARE PLAN 30 MIN: CPT | Mod: 25

## 2021-09-21 PROCEDURE — 71275 CT ANGIOGRAPHY CHEST: CPT | Mod: 26,MA

## 2021-09-21 PROCEDURE — 71045 X-RAY EXAM CHEST 1 VIEW: CPT | Mod: 26

## 2021-09-21 PROCEDURE — 93010 ELECTROCARDIOGRAM REPORT: CPT

## 2021-09-21 PROCEDURE — 99285 EMERGENCY DEPT VISIT HI MDM: CPT | Mod: CS

## 2021-09-21 PROCEDURE — 99223 1ST HOSP IP/OBS HIGH 75: CPT | Mod: AI

## 2021-09-21 RX ORDER — FLUTICASONE PROPIONATE 50 MCG
0 SPRAY, SUSPENSION NASAL
Qty: 16 | Refills: 0 | DISCHARGE

## 2021-09-21 RX ORDER — VANCOMYCIN HCL 1 G
1000 VIAL (EA) INTRAVENOUS EVERY 12 HOURS
Refills: 0 | Status: DISCONTINUED | OUTPATIENT
Start: 2021-09-21 | End: 2021-09-22

## 2021-09-21 RX ORDER — CEFEPIME 1 G/1
2000 INJECTION, POWDER, FOR SOLUTION INTRAMUSCULAR; INTRAVENOUS ONCE
Refills: 0 | Status: COMPLETED | OUTPATIENT
Start: 2021-09-21 | End: 2021-09-21

## 2021-09-21 RX ORDER — CHLORHEXIDINE GLUCONATE 213 G/1000ML
1 SOLUTION TOPICAL
Refills: 0 | Status: DISCONTINUED | OUTPATIENT
Start: 2021-09-21 | End: 2021-09-27

## 2021-09-21 RX ORDER — SODIUM CHLORIDE 9 MG/ML
500 INJECTION, SOLUTION INTRAVENOUS ONCE
Refills: 0 | Status: COMPLETED | OUTPATIENT
Start: 2021-09-21 | End: 2021-09-21

## 2021-09-21 RX ORDER — PANTOPRAZOLE SODIUM 20 MG/1
40 TABLET, DELAYED RELEASE ORAL
Refills: 0 | Status: DISCONTINUED | OUTPATIENT
Start: 2021-09-21 | End: 2021-09-27

## 2021-09-21 RX ORDER — AMLODIPINE BESYLATE 2.5 MG/1
2.5 TABLET ORAL ONCE
Refills: 0 | Status: DISCONTINUED | OUTPATIENT
Start: 2021-09-21 | End: 2021-09-21

## 2021-09-21 RX ORDER — SIMVASTATIN 20 MG/1
0 TABLET, FILM COATED ORAL
Qty: 0 | Refills: 0 | DISCHARGE

## 2021-09-21 RX ORDER — CEFEPIME 1 G/1
2000 INJECTION, POWDER, FOR SOLUTION INTRAMUSCULAR; INTRAVENOUS EVERY 12 HOURS
Refills: 0 | Status: DISCONTINUED | OUTPATIENT
Start: 2021-09-21 | End: 2021-09-22

## 2021-09-21 RX ORDER — APIXABAN 2.5 MG/1
2.5 TABLET, FILM COATED ORAL
Refills: 0 | Status: DISCONTINUED | OUTPATIENT
Start: 2021-09-21 | End: 2021-09-27

## 2021-09-21 RX ORDER — IPRATROPIUM/ALBUTEROL SULFATE 18-103MCG
3 AEROSOL WITH ADAPTER (GRAM) INHALATION EVERY 6 HOURS
Refills: 0 | Status: DISCONTINUED | OUTPATIENT
Start: 2021-09-21 | End: 2021-09-27

## 2021-09-21 RX ORDER — VANCOMYCIN HCL 1 G
1000 VIAL (EA) INTRAVENOUS ONCE
Refills: 0 | Status: COMPLETED | OUTPATIENT
Start: 2021-09-21 | End: 2021-09-21

## 2021-09-21 RX ORDER — ACETAMINOPHEN 500 MG
650 TABLET ORAL EVERY 6 HOURS
Refills: 0 | Status: DISCONTINUED | OUTPATIENT
Start: 2021-09-21 | End: 2021-09-27

## 2021-09-21 RX ORDER — HYDROCHLOROTHIAZIDE 25 MG
12.5 TABLET ORAL DAILY
Refills: 0 | Status: DISCONTINUED | OUTPATIENT
Start: 2021-09-22 | End: 2021-09-27

## 2021-09-21 RX ORDER — APIXABAN 2.5 MG/1
1 TABLET, FILM COATED ORAL
Qty: 0 | Refills: 0 | DISCHARGE

## 2021-09-21 RX ORDER — FUROSEMIDE 40 MG
40 TABLET ORAL DAILY
Refills: 0 | Status: DISCONTINUED | OUTPATIENT
Start: 2021-09-21 | End: 2021-09-23

## 2021-09-21 RX ORDER — HYDROCHLOROTHIAZIDE 25 MG
12.5 TABLET ORAL ONCE
Refills: 0 | Status: DISCONTINUED | OUTPATIENT
Start: 2021-09-21 | End: 2021-09-27

## 2021-09-21 RX ORDER — SIMVASTATIN 20 MG/1
20 TABLET, FILM COATED ORAL AT BEDTIME
Refills: 0 | Status: DISCONTINUED | OUTPATIENT
Start: 2021-09-21 | End: 2021-09-27

## 2021-09-21 RX ORDER — INFLUENZA VIRUS VACCINE 15; 15; 15; 15 UG/.5ML; UG/.5ML; UG/.5ML; UG/.5ML
0.5 SUSPENSION INTRAMUSCULAR ONCE
Refills: 0 | Status: COMPLETED | OUTPATIENT
Start: 2021-09-21 | End: 2021-09-27

## 2021-09-21 RX ADMIN — APIXABAN 2.5 MILLIGRAM(S): 2.5 TABLET, FILM COATED ORAL at 18:06

## 2021-09-21 RX ADMIN — CEFEPIME 100 MILLIGRAM(S): 1 INJECTION, POWDER, FOR SOLUTION INTRAMUSCULAR; INTRAVENOUS at 12:48

## 2021-09-21 RX ADMIN — Medication 250 MILLIGRAM(S): at 18:26

## 2021-09-21 RX ADMIN — CEFEPIME 100 MILLIGRAM(S): 1 INJECTION, POWDER, FOR SOLUTION INTRAMUSCULAR; INTRAVENOUS at 18:26

## 2021-09-21 RX ADMIN — Medication 250 MILLIGRAM(S): at 13:38

## 2021-09-21 RX ADMIN — SIMVASTATIN 20 MILLIGRAM(S): 20 TABLET, FILM COATED ORAL at 21:24

## 2021-09-21 RX ADMIN — SODIUM CHLORIDE 500 MILLILITER(S): 9 INJECTION, SOLUTION INTRAVENOUS at 11:06

## 2021-09-21 NOTE — H&P ADULT - NSICDXPASTMEDICALHX_GEN_ALL_CORE_FT
PAST MEDICAL HISTORY:  Afib     Hodgkin lymphoma in remission  hx lymph node resection    HTN (hypertension)     Pulmonary fibrosis     RA (rheumatoid arthritis)

## 2021-09-21 NOTE — ED PROVIDER NOTE - CARE PLAN
Principal Discharge DX:	Pneumonia  Secondary Diagnosis:	Elevated troponin  Secondary Diagnosis:	Congestive heart failure (CHF)   1

## 2021-09-21 NOTE — ED ADULT NURSE REASSESSMENT NOTE - NS ED NURSE REASSESS COMMENT FT1
pt received from previous rn, pt aoox23, resps even and unlabored. pt on oxygen nasal cannula 3L. pt taken to CT at this time,.

## 2021-09-21 NOTE — H&P ADULT - ATTENDING COMMENTS
Patient seen and examined independently  Agree with medical PA's H&P except where edited by me    Pt is a 89F w/ PMH RA, pulmonary fibrosis, and afib (on eliquis) being admitted for PNA after collapsing while trying to walk from the car into the lab clinic.    #Acute on chronic hypoxic respiratory failure secondary to pna/fluid overload  #Syncope?  #Pulmonary fibrosis  #Leukocytosis  #LE edema  -admit to tele  -trend enzymes x 3  -supplemental oxygen  -IV abx  -IV Lasix  -daily weights, I&O's, fluid restrict to 1.2 liters  -check echo  -orthostatics  -pulm consult  -d/c norvasc as it can cause LE edema    #hyperkalemia-hemolyzed   -repeat labs in am    #Chronic afib  -on eliquis    #Obesity  -diet and lifestyle modification    #DL  -on statin    #Dysphagia  -start puree diet  -speech and swallow eval pending    RA  -previously on prednisone at home    DNR/DNI - MOLST filled out by me  Discussed with pt's daughter bedside  Spent over 70 min on medical management

## 2021-09-21 NOTE — ED ADULT NURSE NOTE - PAIN RATING/NUMBER SCALE (0-10): REST
Patient presents for tx as scheduled. He had recent blood transfusion and states that it improved his SOB. He has taken prescribed premed and will get tx today as scheduled.
0

## 2021-09-21 NOTE — ED PROVIDER NOTE - NS ED ROS FT
Review of Systems:  CONSTITUTIONAL: No fever, No diaphoresis   SKIN: No rash  HEMATOLOGIC: No abnormal bleeding   EYES: No eye pain  ENT: No sore throat, No neck pain, No rhinorrhea  RESPIRATORY: +shortness of breath, +cough  CARDIAC: No chest pain, No palpitations  GI: No abdominal pain, No nausea, No vomiting, No diarrhea, No constipation, No bright red blood per rectum or melena. No flank pain.   : No dysuria, frequency, hematuria.   MUSCULOSKELETAL: No joint paint, No swelling, No back pain  NEUROLOGIC: No numbness, No focal weakness, No headache, No dizziness  All other systems negative, unless specified in HPI

## 2021-09-21 NOTE — H&P ADULT - PROBLEM SELECTOR PLAN 1
-  - R/o aspiration PNA  - Continue IV abx   - Speech eval   - Pureed until recommendations given by speech

## 2021-09-21 NOTE — ED PROVIDER NOTE - PHYSICAL EXAMINATION
CONSTITUTIONAL: Well-developed; well-nourished; mild tachypnea, comfortable on NRB   SKIN: warm, dry  HEAD: Normocephalic; atraumatic.  EYES: no conjunctival injection. EOMI.   ENT: No nasal discharge; airway clear.  NECK: Supple; non tender.  CARD: +irregularly irregular   RESP: +bilateral rhonchi/rales   ABD: soft ntnd.   EXT: Normal ROM. +bilateral LE 2+ pitting edema.   LYMPH: No acute cervical adenopathy.  NEURO: Alert, oriented, grossly unremarkable. No FND.   PSYCH: Cooperative, appropriate.

## 2021-09-21 NOTE — GOALS OF CARE CONVERSATION - ADVANCED CARE PLANNING - CONVERSATION DETAILS
89 year old with multiple medical problems presents with acute hypoxic respiratory failure.  Pt's daughter wishes for her daughter  to be a DNR/DNI.  ROSA signed today

## 2021-09-21 NOTE — H&P ADULT - HISTORY OF PRESENT ILLNESS
Pt is a 89F w/ PMH RA, pulmonary fibrosis, and afib (on eliquis) being admitted for PNA after collapsing while trying to walk from the car into the lab clinic. Collateral obtained from daughter at bedside. Pt became very short of breath while walking today and collapsed in front of the lab clinic. Prior to this, pt's daughter noticed increased LE edema this AM. Daughter also noted that the patient was coughing while she was eating 2d ago. Pt reports SOB is worse with ambulation. Pt w/o reported hx of CHF. Of note, pt has been taking 5mg of prednisone on and off recently for hx RA. Denies HA, dizziness, fever/chills, SOB, chest pain, NVD, abdominal pain, change in urination and change in BM.     In ED, pt is afebrile w/ WBC: 16.8. BP: 113/64, HR: 90. K: 5.7 hemolyzed, Trop: 0.02, BNP: 3366, CTA chest: interstitial lung dz and acute PNA, CXR: acute PNA. Pt was given 2g cefepime, 1g vanco, and LR 500mL.  Pt is a 89F w/ PMH RA, pulmonary fibrosis, and afib (on eliquis) being admitted for PNA after collapsing while trying to walk from the car into the lab clinic. Collateral obtained from daughter at bedside. Pt became very short of breath while walking today and collapsed in front of the lab clinic. Prior to this, pt's daughter noticed increased LE edema this AM. Daughter also noted that the patient was coughing while she was eating 2d ago. Pt reports SOB is worse with ambulation. Pt w/o reported hx of CHF. Of note, pt has been taking 5mg of prednisone on and off recently for hx RA. Denies HA, dizziness, fever/chills, chest pain, NVD, abdominal pain, change in urination and change in BM.     In ED, pt is afebrile w/ WBC: 16.8. BP: 113/64, HR: 90. K: 5.7 hemolyzed, Trop: 0.02, BNP: 3366, CTA chest: interstitial lung dz and acute PNA, CXR: acute PNA. Pt was given 2g cefepime, 1g vanco, and LR 500mL.

## 2021-09-21 NOTE — H&P ADULT - PROBLEM SELECTOR PLAN 2
-  - LE duplex   - TTE   - No reported hx CHF, pt follows w/ cardiologist Dr. Cooper regularly   - ECHO in 2018 showing normal EF 55-60%  - BNP elevated to 3366  - IV Lasix 40mg QD   - Pulm c/s

## 2021-09-21 NOTE — ED ADULT TRIAGE NOTE - CHIEF COMPLAINT QUOTE
BIBA pt was going to her MD and felt SOB walking to the car. She had to sit down. EMS arrived pulse ox 86%RA. EMS placed pt on 15 liters pulse ox 97% as per EMS.

## 2021-09-21 NOTE — H&P ADULT - NSHPPHYSICALEXAM_GEN_ALL_CORE
T(C): 37.3 (09-21-21 @ 11:14), Max: 37.3 (09-21-21 @ 11:14)  HR: 90 (09-21-21 @ 12:49) (90 - 108)  BP: 113/64 (09-21-21 @ 12:49) (98/58 - 113/64)  RR: 20 (09-21-21 @ 12:49) (20 - 22)  SpO2: 96% (09-21-21 @ 12:49) (93% - 96%)    PHYSICAL EXAM:  GENERAL: NAD, AOx3  HEAD:  Atraumatic, Normocephalic  CHEST/LUNG: vesicular breath sounds b/l; +wheezing. No rales or rhonchi  HEART: S1,S2 Regular rate and rhythm; No murmurs, rubs, or gallops  ABDOMEN: Soft, nontender, nondistended, no rebound tenderness; No palpable masses, +BS  EXTREMITIES:  2+ peripheral pulses bilaterally and symmetrically, no clubbing, cyanosis, or edema  SKIN: No rashes or lesions T(C): 37.3 (09-21-21 @ 11:14), Max: 37.3 (09-21-21 @ 11:14)  HR: 90 (09-21-21 @ 12:49) (90 - 108)  BP: 113/64 (09-21-21 @ 12:49) (98/58 - 113/64)  RR: 20 (09-21-21 @ 12:49) (20 - 22)  SpO2: 96% (09-21-21 @ 12:49) (93% - 96%)    PHYSICAL EXAM:  GENERAL: NAD, AOx3  HEAD:  Atraumatic, Normocephalic  CHEST/LUNG: vesicular breath sounds b/l; +wheezing. No rales or rhonchi  HEART: S1,S2 Regular rate and rhythm; No murmurs, rubs, or gallops  ABDOMEN: Soft, nontender, nondistended, no rebound tenderness; No palpable masses, +BS  EXTREMITIES:  2+ peripheral pulses bilaterally and symmetrically, no clubbing or cyanosis. +LE edema   SKIN: No rashes or lesions

## 2021-09-21 NOTE — ED PROVIDER NOTE - CLINICAL SUMMARY MEDICAL DECISION MAKING FREE TEXT BOX
89yo F history of AFib Eliquis presenting with generalized weakness, shortness of breath, cough. No fevers. No chest pain. Well appearing, NAD, non toxic. NCAT PERRLA EOMI neck supple non tender normal wob cta bl irregular tachycardic abdomen s nt nd no rebound no guarding WWPx4 neuro non focal 2+ pitting edema to the bilateral lower extremities. labs ekg imaging reviewed. +CAP. abx given. vitals improved. will admit.

## 2021-09-21 NOTE — H&P ADULT - PROBLEM SELECTOR PLAN 6
-  - Former smoker  - Monitor SPO2; supplement PRN  - Not on any outpatient inhalers  - Eligioonebs PRN

## 2021-09-21 NOTE — ED PROVIDER NOTE - OBJECTIVE STATEMENT
89 y/o F PMHx Hodgkin's lymphoma in remission, Afib on eliquis, RA,  mitral regurgitation presents to ED with two weeks of generalized weakness. Daughter reports patient developed cough and SOB over past two days. Initial sat 82% on room air, corrected with NRB. Pt denies chest pain, nausea, vomiting. No recent fevers.

## 2021-09-21 NOTE — H&P ADULT - NSHPLABSRESULTS_GEN_ALL_CORE
13.1   16.84 )-----------( 280      ( 21 Sep 2021 10:50 )             41.4       09-21    138  |  101  |  23<H>  ----------------------------<  91  5.7<H>   |  25  |  0.7    Ca    8.6      21 Sep 2021 10:50    TPro  5.9<L>  /  Alb  3.5  /  TBili  0.7  /  DBili  x   /  AST  30  /  ALT  11  /  AlkPhos  99  09-21                      PT/INR - ( 21 Sep 2021 10:50 )   PT: 15.10 sec;   INR: 1.32 ratio         PTT - ( 21 Sep 2021 10:50 )  PTT:38.8 sec    Lactate Trend      CARDIAC MARKERS ( 21 Sep 2021 10:50 )  x     / 0.02 ng/mL / x     / x     / x          < from: CT Angio Chest PE Protocol w/ IV Cont (09.21.21 @ 12:16) >    IMPRESSION: No pulmonary embolus.    Diffuse interstitial lung disease. Progression of groundglass opacities which may represent a component of acute pneumonia. Please correlate clinically.          --- End of Report ---              ELENA DAVIS MD; Attending Radiologist  This document has been electronically signed. Sep 21 2021 12:41PM    < end of copied text >    < from: Xray Chest 1 View- PORTABLE-Urgent (09.21.21 @ 10:03) >    Impression:    Chronic interstitial lung disease with new patchy bilateral airspace opacities suspicious for pneumonia in the appropriate clinical setting.  Enlarged cardiac silhouette.    --- End of Report ---              ENIO HARMON MD; Attending Radiologist  This document has been electronically signed. Sep 21 2021  1:20PM    < end of copied text >

## 2021-09-21 NOTE — H&P ADULT - ASSESSMENT
ASSESSMENT: Pt is a 89F w/ PMH RA, pulmonary fibrosis, and afib (on eliquis) being admitted for PNA after collapsing while trying to walk from the car into the lab clinic. Prior to this, pt's daughter noticed increased LE edema this AM.  In ED, pt is afebrile w/ WBC: 16.8. BP: 113/64, HR: 90. K: 5.7 hemolyzed, Trop: 0.02, BNP: 3366, CTA chest: interstitial lung dz and acute PNA, CXR: acute PNA. Pt was given 2g cefepime, 1g vanco, and LR 500mL.         PLAN: Case d/w Dr. Calloway  - Admit to inpatient level of care - non-ccu tele  - Continue home medications  - AM labs   - Monitor vitals   - Pureed Diet - DASH   - CHG bath  - OOB w/ assistance   - PT consult  - VTE: on eliquis   - GI: protonix

## 2021-09-22 LAB
ALBUMIN SERPL ELPH-MCNC: 3.8 G/DL — SIGNIFICANT CHANGE UP (ref 3.5–5.2)
ALP SERPL-CCNC: 103 U/L — SIGNIFICANT CHANGE UP (ref 30–115)
ALT FLD-CCNC: 12 U/L — SIGNIFICANT CHANGE UP (ref 0–41)
ANION GAP SERPL CALC-SCNC: 14 MMOL/L — SIGNIFICANT CHANGE UP (ref 7–14)
AST SERPL-CCNC: 17 U/L — SIGNIFICANT CHANGE UP (ref 0–41)
BASOPHILS # BLD AUTO: 0.06 K/UL — SIGNIFICANT CHANGE UP (ref 0–0.2)
BASOPHILS NFR BLD AUTO: 0.5 % — SIGNIFICANT CHANGE UP (ref 0–1)
BILIRUB SERPL-MCNC: 0.9 MG/DL — SIGNIFICANT CHANGE UP (ref 0.2–1.2)
BUN SERPL-MCNC: 16 MG/DL — SIGNIFICANT CHANGE UP (ref 10–20)
CALCIUM SERPL-MCNC: 8.9 MG/DL — SIGNIFICANT CHANGE UP (ref 8.5–10.1)
CHLORIDE SERPL-SCNC: 99 MMOL/L — SIGNIFICANT CHANGE UP (ref 98–110)
CK MB CFR SERPL CALC: 2.1 NG/ML — SIGNIFICANT CHANGE UP (ref 0.6–6.3)
CK SERPL-CCNC: 53 U/L — SIGNIFICANT CHANGE UP (ref 0–225)
CO2 SERPL-SCNC: 24 MMOL/L — SIGNIFICANT CHANGE UP (ref 17–32)
CREAT SERPL-MCNC: 0.7 MG/DL — SIGNIFICANT CHANGE UP (ref 0.7–1.5)
CRP SERPL-MCNC: 38 MG/L — HIGH
CULTURE RESULTS: NO GROWTH — SIGNIFICANT CHANGE UP
EOSINOPHIL # BLD AUTO: 0.11 K/UL — SIGNIFICANT CHANGE UP (ref 0–0.7)
EOSINOPHIL NFR BLD AUTO: 0.9 % — SIGNIFICANT CHANGE UP (ref 0–8)
FERRITIN SERPL-MCNC: 171 NG/ML — HIGH (ref 15–150)
GLUCOSE SERPL-MCNC: 132 MG/DL — HIGH (ref 70–99)
HCT VFR BLD CALC: 42 % — SIGNIFICANT CHANGE UP (ref 37–47)
HGB BLD-MCNC: 13.2 G/DL — SIGNIFICANT CHANGE UP (ref 12–16)
IMM GRANULOCYTES NFR BLD AUTO: 0.6 % — HIGH (ref 0.1–0.3)
LYMPHOCYTES # BLD AUTO: 1.08 K/UL — LOW (ref 1.2–3.4)
LYMPHOCYTES # BLD AUTO: 8.6 % — LOW (ref 20.5–51.1)
MCHC RBC-ENTMCNC: 27.4 PG — SIGNIFICANT CHANGE UP (ref 27–31)
MCHC RBC-ENTMCNC: 31.4 G/DL — LOW (ref 32–37)
MCV RBC AUTO: 87.1 FL — SIGNIFICANT CHANGE UP (ref 81–99)
MONOCYTES # BLD AUTO: 1.05 K/UL — HIGH (ref 0.1–0.6)
MONOCYTES NFR BLD AUTO: 8.4 % — SIGNIFICANT CHANGE UP (ref 1.7–9.3)
NEUTROPHILS # BLD AUTO: 10.16 K/UL — HIGH (ref 1.4–6.5)
NEUTROPHILS NFR BLD AUTO: 81 % — HIGH (ref 42.2–75.2)
NRBC # BLD: 0 /100 WBCS — SIGNIFICANT CHANGE UP (ref 0–0)
PLATELET # BLD AUTO: 286 K/UL — SIGNIFICANT CHANGE UP (ref 130–400)
POTASSIUM SERPL-MCNC: 3.7 MMOL/L — SIGNIFICANT CHANGE UP (ref 3.5–5)
POTASSIUM SERPL-SCNC: 3.7 MMOL/L — SIGNIFICANT CHANGE UP (ref 3.5–5)
PROCALCITONIN SERPL-MCNC: 0.06 NG/ML — SIGNIFICANT CHANGE UP (ref 0.02–0.1)
PROT SERPL-MCNC: 5.9 G/DL — LOW (ref 6–8)
RAPID RVP RESULT: SIGNIFICANT CHANGE UP
RBC # BLD: 4.82 M/UL — SIGNIFICANT CHANGE UP (ref 4.2–5.4)
RBC # FLD: 17.2 % — HIGH (ref 11.5–14.5)
SARS-COV-2 RNA SPEC QL NAA+PROBE: SIGNIFICANT CHANGE UP
SODIUM SERPL-SCNC: 137 MMOL/L — SIGNIFICANT CHANGE UP (ref 135–146)
SPECIMEN SOURCE: SIGNIFICANT CHANGE UP
TROPONIN T SERPL-MCNC: <0.01 NG/ML — SIGNIFICANT CHANGE UP
WBC # BLD: 12.53 K/UL — HIGH (ref 4.8–10.8)
WBC # FLD AUTO: 12.53 K/UL — HIGH (ref 4.8–10.8)

## 2021-09-22 PROCEDURE — 93010 ELECTROCARDIOGRAM REPORT: CPT

## 2021-09-22 PROCEDURE — 93306 TTE W/DOPPLER COMPLETE: CPT | Mod: 26

## 2021-09-22 PROCEDURE — 99233 SBSQ HOSP IP/OBS HIGH 50: CPT

## 2021-09-22 PROCEDURE — 99222 1ST HOSP IP/OBS MODERATE 55: CPT

## 2021-09-22 RX ADMIN — APIXABAN 2.5 MILLIGRAM(S): 2.5 TABLET, FILM COATED ORAL at 05:39

## 2021-09-22 RX ADMIN — Medication 12.5 MILLIGRAM(S): at 05:40

## 2021-09-22 RX ADMIN — PANTOPRAZOLE SODIUM 40 MILLIGRAM(S): 20 TABLET, DELAYED RELEASE ORAL at 05:40

## 2021-09-22 RX ADMIN — Medication 250 MILLIGRAM(S): at 05:40

## 2021-09-22 RX ADMIN — CEFEPIME 100 MILLIGRAM(S): 1 INJECTION, POWDER, FOR SOLUTION INTRAMUSCULAR; INTRAVENOUS at 05:39

## 2021-09-22 RX ADMIN — SIMVASTATIN 20 MILLIGRAM(S): 20 TABLET, FILM COATED ORAL at 22:20

## 2021-09-22 RX ADMIN — APIXABAN 2.5 MILLIGRAM(S): 2.5 TABLET, FILM COATED ORAL at 16:55

## 2021-09-22 RX ADMIN — Medication 40 MILLIGRAM(S): at 05:40

## 2021-09-22 NOTE — SWALLOW BEDSIDE ASSESSMENT ADULT - SLP PERTINENT HISTORY OF CURRENT PROBLEM
89F w/ PMH RA, pulmonary fibrosis, and afib (on eliquis) presented to ED after becoming SOB and weak on her way to PMD- was hypoxic to 86% in the field.

## 2021-09-22 NOTE — PHYSICAL THERAPY INITIAL EVALUATION ADULT - GAIT DEVIATIONS NOTED, PT EVAL
stooped posture, dec heel strike/pushoff , dec MACARIO, incomplete foot clearance/decreased jyoti/decreased step length/decreased weight-shifting ability

## 2021-09-22 NOTE — SWALLOW BEDSIDE ASSESSMENT ADULT - COMMENTS
patient refused x 2 attempts. discussed rational for assessment and patient continued to refuse to accept trials.

## 2021-09-22 NOTE — PHYSICAL THERAPY INITIAL EVALUATION ADULT - PERTINENT HX OF CURRENT PROBLEM, REHAB EVAL
89 y/o female admitted with diagnoses of Pneumonia, Elevated troponin, CHF after collapsing while walking; placed on tele

## 2021-09-22 NOTE — PHYSICAL THERAPY INITIAL EVALUATION ADULT - GENERAL OBSERVATIONS, REHAB EVAL
09:50-10:13 Chart reviewed. Pt encountered semireclined in bed, may be seen by Physical Therapist as confirmed with Nurse. Patient denied pain at rest and would try to stand but would like to go back to bed; +tele; +O2 via NC; +Primafit

## 2021-09-23 LAB
ALBUMIN SERPL ELPH-MCNC: 3.8 G/DL — SIGNIFICANT CHANGE UP (ref 3.5–5.2)
ALP SERPL-CCNC: 99 U/L — SIGNIFICANT CHANGE UP (ref 30–115)
ALT FLD-CCNC: 10 U/L — SIGNIFICANT CHANGE UP (ref 0–41)
ANION GAP SERPL CALC-SCNC: 12 MMOL/L — SIGNIFICANT CHANGE UP (ref 7–14)
AST SERPL-CCNC: 14 U/L — SIGNIFICANT CHANGE UP (ref 0–41)
BASOPHILS # BLD AUTO: 0.05 K/UL — SIGNIFICANT CHANGE UP (ref 0–0.2)
BASOPHILS NFR BLD AUTO: 0.3 % — SIGNIFICANT CHANGE UP (ref 0–1)
BILIRUB SERPL-MCNC: 1.1 MG/DL — SIGNIFICANT CHANGE UP (ref 0.2–1.2)
BUN SERPL-MCNC: 16 MG/DL — SIGNIFICANT CHANGE UP (ref 10–20)
CALCIUM SERPL-MCNC: 8.9 MG/DL — SIGNIFICANT CHANGE UP (ref 8.5–10.1)
CHLORIDE SERPL-SCNC: 97 MMOL/L — LOW (ref 98–110)
CO2 SERPL-SCNC: 29 MMOL/L — SIGNIFICANT CHANGE UP (ref 17–32)
COVID-19 SPIKE DOMAIN AB INTERP: NEGATIVE — SIGNIFICANT CHANGE UP
COVID-19 SPIKE DOMAIN ANTIBODY RESULT: 0.4 U/ML — SIGNIFICANT CHANGE UP
CREAT SERPL-MCNC: 0.7 MG/DL — SIGNIFICANT CHANGE UP (ref 0.7–1.5)
EOSINOPHIL # BLD AUTO: 0.12 K/UL — SIGNIFICANT CHANGE UP (ref 0–0.7)
EOSINOPHIL NFR BLD AUTO: 0.7 % — SIGNIFICANT CHANGE UP (ref 0–8)
GLUCOSE SERPL-MCNC: 119 MG/DL — HIGH (ref 70–99)
HCT VFR BLD CALC: 44.7 % — SIGNIFICANT CHANGE UP (ref 37–47)
HGB BLD-MCNC: 14.1 G/DL — SIGNIFICANT CHANGE UP (ref 12–16)
IMM GRANULOCYTES NFR BLD AUTO: 0.5 % — HIGH (ref 0.1–0.3)
LYMPHOCYTES # BLD AUTO: 1.13 K/UL — LOW (ref 1.2–3.4)
LYMPHOCYTES # BLD AUTO: 6.5 % — LOW (ref 20.5–51.1)
MCHC RBC-ENTMCNC: 27.1 PG — SIGNIFICANT CHANGE UP (ref 27–31)
MCHC RBC-ENTMCNC: 31.5 G/DL — LOW (ref 32–37)
MCV RBC AUTO: 86 FL — SIGNIFICANT CHANGE UP (ref 81–99)
MONOCYTES # BLD AUTO: 1.83 K/UL — HIGH (ref 0.1–0.6)
MONOCYTES NFR BLD AUTO: 10.6 % — HIGH (ref 1.7–9.3)
NEUTROPHILS # BLD AUTO: 14.12 K/UL — HIGH (ref 1.4–6.5)
NEUTROPHILS NFR BLD AUTO: 81.4 % — HIGH (ref 42.2–75.2)
NRBC # BLD: 0 /100 WBCS — SIGNIFICANT CHANGE UP (ref 0–0)
PLATELET # BLD AUTO: 264 K/UL — SIGNIFICANT CHANGE UP (ref 130–400)
POTASSIUM SERPL-MCNC: 3.6 MMOL/L — SIGNIFICANT CHANGE UP (ref 3.5–5)
POTASSIUM SERPL-SCNC: 3.6 MMOL/L — SIGNIFICANT CHANGE UP (ref 3.5–5)
PROT SERPL-MCNC: 5.8 G/DL — LOW (ref 6–8)
RBC # BLD: 5.2 M/UL — SIGNIFICANT CHANGE UP (ref 4.2–5.4)
RBC # FLD: 17.2 % — HIGH (ref 11.5–14.5)
SARS-COV-2 IGG+IGM SERPL QL IA: 0.4 U/ML — SIGNIFICANT CHANGE UP
SARS-COV-2 IGG+IGM SERPL QL IA: NEGATIVE — SIGNIFICANT CHANGE UP
SODIUM SERPL-SCNC: 138 MMOL/L — SIGNIFICANT CHANGE UP (ref 135–146)
WBC # BLD: 17.33 K/UL — HIGH (ref 4.8–10.8)
WBC # FLD AUTO: 17.33 K/UL — HIGH (ref 4.8–10.8)

## 2021-09-23 PROCEDURE — 71045 X-RAY EXAM CHEST 1 VIEW: CPT | Mod: 26

## 2021-09-23 PROCEDURE — 99222 1ST HOSP IP/OBS MODERATE 55: CPT

## 2021-09-23 PROCEDURE — 99233 SBSQ HOSP IP/OBS HIGH 50: CPT

## 2021-09-23 PROCEDURE — 74018 RADEX ABDOMEN 1 VIEW: CPT | Mod: 26

## 2021-09-23 RX ADMIN — APIXABAN 2.5 MILLIGRAM(S): 2.5 TABLET, FILM COATED ORAL at 18:12

## 2021-09-23 RX ADMIN — APIXABAN 2.5 MILLIGRAM(S): 2.5 TABLET, FILM COATED ORAL at 05:37

## 2021-09-23 RX ADMIN — Medication 40 MILLIGRAM(S): at 05:37

## 2021-09-23 RX ADMIN — SIMVASTATIN 20 MILLIGRAM(S): 20 TABLET, FILM COATED ORAL at 21:29

## 2021-09-23 RX ADMIN — PANTOPRAZOLE SODIUM 40 MILLIGRAM(S): 20 TABLET, DELAYED RELEASE ORAL at 05:37

## 2021-09-23 RX ADMIN — Medication 12.5 MILLIGRAM(S): at 05:37

## 2021-09-23 NOTE — SWALLOW BEDSIDE ASSESSMENT ADULT - ASR SWALLOW RECOMMEND DIAG
SLP to consider to r/o silent aspiration. Patient stated she would be willing to complete further testing./VFSS/MBS

## 2021-09-23 NOTE — SWALLOW BEDSIDE ASSESSMENT ADULT - SWALLOW EVAL: DIAGNOSIS
dysphonia and generalized weakness, no accepted trials
Mild oral impairment with suspected pharyngeal dysphagia with more complex solids.

## 2021-09-23 NOTE — CONSULT NOTE ADULT - SUBJECTIVE AND OBJECTIVE BOX
Patient is a 89y old  Female who presents with a chief complaint of PNA (21 Sep 2021 13:43)      HPI:  Pt is a 89F w/ PMH RA, pulmonary fibrosis, and afib (on eliquis) being admitted for PNA after collapsing while trying to walk from the car into the lab clinic. Collateral obtained from daughter at bedside. Pt became very short of breath while walking today and collapsed in front of the lab clinic. Prior to this, pt's daughter noticed increased LE edema this AM. Daughter also noted that the patient was coughing while she was eating 2d ago. Pt reports SOB is worse with ambulation. Pt w/o reported hx of CHF. Of note, pt has been taking 5mg of prednisone on and off recently for hx RA. Denies HA, dizziness, fever/chills, chest pain, NVD, abdominal pain, change in urination and change in BM.     In ED, pt is afebrile w/ WBC: 16.8. BP: 113/64, HR: 90. K: 5.7 hemolyzed, Trop: 0.02, BNP: 3366, CTA chest: interstitial lung dz and acute PNA, CXR: acute PNA. Pt was given 2g cefepime, 1g vanco, and LR 500mL.  (21 Sep 2021 13:43)      PAST MEDICAL & SURGICAL HISTORY:  HTN (hypertension)    Afib    RA (rheumatoid arthritis)    Hodgkin lymphoma  in remission  hx lymph node resection    Pulmonary fibrosis    Carpal tunnel syndrome  surgery        FAMILY HISTORY:  No pertinent family history in first degree relatives      Family history: No family cardiovascular system   Occupation:  Alochol: Denied  Smoking: Denied  Drug Use: Denied  Marital Status:           Allergies    penicillin (Short breath; Rash)    Intolerances        Home Medications:  amLODIPine 2.5 mg oral tablet: 1 tab(s) orally once a day (21 Sep 2021 13:44)  Eliquis 2.5 mg oral tablet: 1 tab(s) orally 2 times a day (21 Sep 2021 13:44)  hydroCHLOROthiazide 12.5 mg oral tablet: 1 tab(s) orally once a day (21 Sep 2021 13:44)  simvastatin 20 mg oral tablet: orally once a day (21 Sep 2021 13:44)      ROS: as in HPI; All other systems reviewed are negative        PHYSICAL EXAM:  Vital Signs Last 24 Hrs  T(C): 36.1 (22 Sep 2021 05:06), Max: 37.3 (21 Sep 2021 11:14)  T(F): 96.9 (22 Sep 2021 05:06), Max: 99.1 (21 Sep 2021 11:14)  HR: 101 (22 Sep 2021 05:06) (77 - 108)  BP: 109/58 (22 Sep 2021 05:06) (98/58 - 115/66)  BP(mean): --  RR: 18 (22 Sep 2021 05:06) (18 - 22)  SpO2: 96% (21 Sep 2021 15:03) (93% - 96%)      GENERAL: awake   HEAD:  Atraumatic, Normocephalic  EYES: EOMI, PERRLA, conjunctiva and sclera clear  ENMT: No tonsillar erythema, exudates, or enlargement; Moist mucous membranes, Good dentition, No lesions  NECK: Supple, No JVD, Normal thyroid  NERVOUS SYSTEM:  Alert & Oriented X3, Good concentration; Motor Strength 5/5 B/L upper and lower extremities; DTRs 2+ intact and symmetric  CHEST/LUNG: Clear to percussion bilaterally; No rales, rhonchi, wheezing, or rubs  HEART: Regular rate and rhythm; No murmurs, rubs, or gallops  ABDOMEN: Soft, Nontender, Nondistended; Bowel sounds present  EXTREMITIES: , No clubbing, cyanosis, 1edema  LYMPH: No lymphadenopathy noted  SKIN: No rashes or lesions    HOSPITAL MEDICATIONS:  MEDICATIONS  (STANDING):  apixaban 2.5 milliGRAM(s) Oral two times a day  cefepime   IVPB 2000 milliGRAM(s) IV Intermittent every 12 hours  chlorhexidine 4% Liquid 1 Application(s) Topical <User Schedule>  furosemide   Injectable 40 milliGRAM(s) IV Push daily  hydrochlorothiazide 12.5 milliGRAM(s) Oral daily  hydrochlorothiazide 12.5 milliGRAM(s) Oral once  influenza   Vaccine 0.5 milliLiter(s) IntraMuscular once  pantoprazole    Tablet 40 milliGRAM(s) Oral before breakfast  simvastatin 20 milliGRAM(s) Oral at bedtime  vancomycin  IVPB 1000 milliGRAM(s) IV Intermittent every 12 hours    MEDICATIONS  (PRN):  acetaminophen   Tablet .. 650 milliGRAM(s) Oral every 6 hours PRN Temp greater or equal to 38C (100.4F), Mild Pain (1 - 3)  albuterol/ipratropium for Nebulization 3 milliLiter(s) Nebulizer every 6 hours PRN Shortness of Breath and/or Wheezing      LABS:                        13.1   16.84 )-----------( 280      ( 21 Sep 2021 10:50 )             41.4         x   |  x   |  x   ----------------------------<  x   4.3   |  x   |  x     Ca    8.6      21 Sep 2021 10:50    TPro  5.9<L>  /  Alb  3.5  /  TBili  0.7  /  DBili  x   /  AST  30  /  ALT  11  /  AlkPhos  99      PT/INR - ( 21 Sep 2021 10:50 )   PT: 15.10 sec;   INR: 1.32 ratio         PTT - ( 21 Sep 2021 10:50 )  PTT:38.8 sec  Urinalysis Basic - ( 21 Sep 2021 13:30 )    Color: Yellow / Appearance: Clear / S.020 / pH: x  Gluc: x / Ketone: Negative  / Bili: Negative / Urobili: 1.0 mg/dL   Blood: x / Protein: 30 mg/dL / Nitrite: Negative   Leuk Esterase: Negative / RBC: 1-2 /HPF / WBC 1-2 /HPF   Sq Epi: x / Non Sq Epi: Few /HPF / Bacteria: Few        Venous Blood Gas:   @ 11:20  7.37/51/32/30/50.4  VBG Lactate: 2.00          RADIOLOGY: ct< from: CT Angio Chest PE Protocol w/ IV Cont (21 @ 12:16) >    Diffuse interstitial lung disease. Progression of groundglass opacities which may represent a component of acute pneumonia. Please correlate clinically.    < end of copied text >    [ ] Reviewed and interpreted by me    ECHO:    Point of Care Ultrasound Findings;    PFT:      
CARDIOLOGY CONSULT NOTE     CHIEF COMPLAINT/REASON FOR CONSULT:    HPI:  Pt is a 89F w/ PMH RA, pulmonary fibrosis, and afib (on eliquis) being admitted for PNA after collapsing while trying to walk from the car into the lab clinic. Collateral obtained from daughter at bedside. Pt became very short of breath while walking  and collapsed in front of the lab clinic. Prior to this, pt's daughter noticed increased LE edema this AM. Daughter also noted that the patient was coughing while she was eating 2d ago. Pt reports SOB is worse with ambulation. Pt w/o reported hx of CHF. Of note, pt has been taking 5mg of prednisone on and off recently for hx RA. Denies HA, dizziness, fever/chills, chest pain, NVD, abdominal pain, change in urination and change in BM.     In ED, pt is afebrile w/ WBC: 16.8. BP: 113/64, HR: 90. K: 5.7 hemolyzed, Trop: 0.02, BNP: 3366, CTA chest: interstitial lung dz and acute PNA, CXR: acute PNA. Pt was given 2g cefepime, 1g vanco, and LR 500mL.  (21 Sep 2021 13:43)      PAST MEDICAL & SURGICAL HISTORY:  HTN (hypertension)    Afib    RA (rheumatoid arthritis)    Hodgkin lymphoma  in remission  hx lymph node resection    Pulmonary fibrosis    Carpal tunnel syndrome  surgery        Cardiac Risks:   [x ]HTN, [ ] DM, [ ] Smoking, [ ] FH,  [ ] Lipids        MEDICATIONS:  MEDICATIONS  (STANDING):  apixaban 2.5 milliGRAM(s) Oral two times a day  chlorhexidine 4% Liquid 1 Application(s) Topical <User Schedule>  hydrochlorothiazide 12.5 milliGRAM(s) Oral daily  hydrochlorothiazide 12.5 milliGRAM(s) Oral once  influenza   Vaccine 0.5 milliLiter(s) IntraMuscular once  pantoprazole    Tablet 40 milliGRAM(s) Oral before breakfast  simvastatin 20 milliGRAM(s) Oral at bedtime      FAMILY HISTORY:  No pertinent family history in first degree relatives        SOCIAL HISTORY:      [ ] Marital status     Allergies    penicillin (Short breath; Rash)        	    REVIEW OF SYSTEMS:  CONSTITUTIONAL: No fever, weight loss, or fatigue  EYES: No eye pain, visual disturbances, or discharge  ENMT:  No difficulty hearing, tinnitus, vertigo; No sinus or throat pain  NECK: No pain or stiffness  RESPIRATORY: No cough, wheezing, chills or hemoptysis; No Shortness of Breath  CARDIOVASCULAR:  See above  GASTROINTESTINAL: No abdominal or epigastric pain. No nausea, vomiting, or hematemesis; No diarrhea or constipation. No melena or hematochezia.  GENITOURINARY: No dysuria, frequency, hematuria, or incontinence  NEUROLOGICAL: No headaches, memory loss, loss of strength, numbness, or tremors  SKIN: No itching, burning, rashes, or lesions   	        PHYSICAL EXAM:  T(C): 36.2 (09-23-21 @ 05:19), Max: 36.2 (09-23-21 @ 05:19)  HR: 81 (09-23-21 @ 05:19) (81 - 97)  BP: 109/56 (09-23-21 @ 05:19) (109/56 - 123/55)  RR: 18 (09-23-21 @ 05:19) (18 - 18)  SpO2: 91% (09-23-21 @ 07:59) (91% - 92%)  Wt(kg): --  I&O's Summary    22 Sep 2021 07:01  -  23 Sep 2021 07:00  --------------------------------------------------------  IN: 0 mL / OUT: 2250 mL / NET: -2250 mL    23 Sep 2021 07:01  -  23 Sep 2021 13:08  --------------------------------------------------------  IN: 0 mL / OUT: 1300 mL / NET: -1300 mL        Appearance: Normal	  Psychiatry: A & O x 3, Mood & affect appropriate  HEENT:   Normal oral mucosa, PERRL, EOMI	  Lymphatic: No lymphadenopathy  Cardiovascular: Normal S1 S2,irreg, No JVD, i/vi zachayr lsb  Respiratory: Lungs clear to auscultation	  Gastrointestinal:  Soft, Non-tender, + BS	  Skin: No rashes, No ecchymoses, No cyanosis	  Neurologic: Non-focal  Extremities: Normal range of motion, No clubbing, cyanosis tr edema left  Vascular: Peripheral pulses palpable 2+ bilaterally      ECG:  	< from: 12 Lead ECG (09.22.21 @ 09:59) >  Diagnosis Line Atrial fibrillation  Right axis deviation  Septal infarct , age undetermined  Abnormal ECG    Confirmed by ILEANA PULLIAM MD ([id number]) on 9/22/2021 12:3    < end of copied text >       ECHO:   < from: TTE Echo Complete w/o Contrast w/ Doppler (09.22.21 @ 13:53) >    Summary:   1. Left ventricular ejection fraction, by visual estimation, is 50 to 55%.   2. Mild leftventricular hypertrophy.   3. Mild to moderately enlarged left atrium.   4. Mildly enlarged right atrium.   5. Mild mitral annular calcification.   6. Moderate tricuspid regurgitation.   7. Severe aortic valve stenosis.   8. Estimated pulmonary artery systolic pressure is 60.2 mmHg assuming a right atrial pressure of 3 mmHg, which is consistent with severe pulmonary hypertension.   9. Peak transaortic gradient equals 105.9 mmHg, mean transaortic gradient equals 51.3 mmHg, the calculated aortic valve area equals 0.97 cm² by the continuity equation consistent with severe aortic stenosis.    < end of copied text >    	  LABS:	 	    CARDIAC MARKERS:                                    14.1   17.33 )-----------( 264      ( 23 Sep 2021 06:10 )             44.7     09-23    138  |  97<L>  |  16  ----------------------------<  119<H>  3.6   |  29  |  0.7    Ca    8.9      23 Sep 2021 06:10    TPro  5.8<L>  /  Alb  3.8  /  TBili  1.1  /  DBili  x   /  AST  14  /  ALT  10  /  AlkPhos  99  09-23

## 2021-09-23 NOTE — SWALLOW BEDSIDE ASSESSMENT ADULT - SWALLOW EVAL: CURRENT DIET
currently, order is Dysphagia diet I puree consistency  thin liquids
Dysphagia diet I puree consistency with thin liquids

## 2021-09-23 NOTE — SWALLOW BEDSIDE ASSESSMENT ADULT - SLP GENERAL OBSERVATIONS
Patient received awake/alert. Patient is AOx1, partial to place, and not time. Patient is able to follow simple commands.

## 2021-09-23 NOTE — SWALLOW BEDSIDE ASSESSMENT ADULT - ORAL PHASE
Decreased anterior-posterior movement of the bolus/Delayed oral transit time Within functional limits Decreased anterior-posterior movement of the bolus

## 2021-09-23 NOTE — SWALLOW BEDSIDE ASSESSMENT ADULT - H & P REVIEW
yes
Pt is a 89F w/ PMH RA, pulmonary fibrosis, and afib (on eliquis) being admitted for PNA after collapsing while trying to walk from the car into the lab clinic. Collateral obtained from daughter at bedside. Pt became very short of breath while walking today and collapsed in front of the lab clinic. Prior to this, pt's daughter noticed increased LE edema this AM. Daughter also noted that the patient was coughing while she was eating 2d ago. Pt reports SOB is worse with ambulation. Pt w/o reported hx of CHF. Of note, pt has been taking 5mg of prednisone on and off recently for hx RA. Denies HA, dizziness, fever/chills, chest pain, NVD, abdominal pain, change in urination and change in BM.  Chest X-Ray on 9/23/2021 significant for: diffuse bilateral lung opacities/yes

## 2021-09-23 NOTE — CONSULT NOTE ADULT - ASSESSMENT
Impression:  SOB   ct scan it is more in favor of worsening ILD when compare to 2019   ?? underlying pna         Plan:  keep pox > 92 %  r/o aspiration speech and swallow eval also r/o gerd   abx zosyn for now check procal and cx  if negative consider d/c abx from pulm   follow cx   nasal mrsa   echo   check orthostatic BP   echo   avoid over diuresis   keep is- os 
Per pulmonary patient with worsening interstitial lung disease She has AS. But with age and multiple problems . Probable medical rx. Recheck lytes on diuretic. Patient DNR/DNI.  Prognosis poor.

## 2021-09-23 NOTE — SWALLOW BEDSIDE ASSESSMENT ADULT - NS ASR SWALLOW FINDINGS DISCUS
discussed with Dr. Pang will initiate correct diet and SLP will reattempt/Physician/Patient
Nursing/Patient

## 2021-09-23 NOTE — SWALLOW BEDSIDE ASSESSMENT ADULT - PHARYNGEAL PHASE
+ toleration observed without overt symptoms of penetration/aspiration No overt s/s of aspiration/penetration occurred, however patient observed with increased WOB, suggestive of decreased coordination with respiration and swallowing for more complex solids, leaving the patient at risk for aspiration.

## 2021-09-24 LAB
ALBUMIN SERPL ELPH-MCNC: 3.5 G/DL — SIGNIFICANT CHANGE UP (ref 3.5–5.2)
ALP SERPL-CCNC: 95 U/L — SIGNIFICANT CHANGE UP (ref 30–115)
ALT FLD-CCNC: 8 U/L — SIGNIFICANT CHANGE UP (ref 0–41)
ANION GAP SERPL CALC-SCNC: 13 MMOL/L — SIGNIFICANT CHANGE UP (ref 7–14)
AST SERPL-CCNC: 9 U/L — SIGNIFICANT CHANGE UP (ref 0–41)
BASOPHILS # BLD AUTO: 0.04 K/UL — SIGNIFICANT CHANGE UP (ref 0–0.2)
BASOPHILS NFR BLD AUTO: 0.2 % — SIGNIFICANT CHANGE UP (ref 0–1)
BILIRUB SERPL-MCNC: 1.3 MG/DL — HIGH (ref 0.2–1.2)
BUN SERPL-MCNC: 19 MG/DL — SIGNIFICANT CHANGE UP (ref 10–20)
CALCIUM SERPL-MCNC: 8.9 MG/DL — SIGNIFICANT CHANGE UP (ref 8.5–10.1)
CHLORIDE SERPL-SCNC: 94 MMOL/L — LOW (ref 98–110)
CO2 SERPL-SCNC: 30 MMOL/L — SIGNIFICANT CHANGE UP (ref 17–32)
CREAT SERPL-MCNC: 0.6 MG/DL — LOW (ref 0.7–1.5)
EOSINOPHIL # BLD AUTO: 0.02 K/UL — SIGNIFICANT CHANGE UP (ref 0–0.7)
EOSINOPHIL NFR BLD AUTO: 0.1 % — SIGNIFICANT CHANGE UP (ref 0–8)
GLUCOSE SERPL-MCNC: 114 MG/DL — HIGH (ref 70–99)
HCT VFR BLD CALC: 42.9 % — SIGNIFICANT CHANGE UP (ref 37–47)
HGB BLD-MCNC: 13.8 G/DL — SIGNIFICANT CHANGE UP (ref 12–16)
IMM GRANULOCYTES NFR BLD AUTO: 0.8 % — HIGH (ref 0.1–0.3)
LYMPHOCYTES # BLD AUTO: 1.25 K/UL — SIGNIFICANT CHANGE UP (ref 1.2–3.4)
LYMPHOCYTES # BLD AUTO: 5.5 % — LOW (ref 20.5–51.1)
MAGNESIUM SERPL-MCNC: 1.9 MG/DL — SIGNIFICANT CHANGE UP (ref 1.8–2.4)
MCHC RBC-ENTMCNC: 27.3 PG — SIGNIFICANT CHANGE UP (ref 27–31)
MCHC RBC-ENTMCNC: 32.2 G/DL — SIGNIFICANT CHANGE UP (ref 32–37)
MCV RBC AUTO: 85 FL — SIGNIFICANT CHANGE UP (ref 81–99)
MONOCYTES # BLD AUTO: 2.37 K/UL — HIGH (ref 0.1–0.6)
MONOCYTES NFR BLD AUTO: 10.5 % — HIGH (ref 1.7–9.3)
NEUTROPHILS # BLD AUTO: 18.69 K/UL — HIGH (ref 1.4–6.5)
NEUTROPHILS NFR BLD AUTO: 82.9 % — HIGH (ref 42.2–75.2)
NRBC # BLD: 0 /100 WBCS — SIGNIFICANT CHANGE UP (ref 0–0)
PHOSPHATE SERPL-MCNC: 3.4 MG/DL — SIGNIFICANT CHANGE UP (ref 2.1–4.9)
PLATELET # BLD AUTO: 251 K/UL — SIGNIFICANT CHANGE UP (ref 130–400)
POTASSIUM SERPL-MCNC: 3.2 MMOL/L — LOW (ref 3.5–5)
POTASSIUM SERPL-SCNC: 3.2 MMOL/L — LOW (ref 3.5–5)
PROT SERPL-MCNC: 5.5 G/DL — LOW (ref 6–8)
RBC # BLD: 5.05 M/UL — SIGNIFICANT CHANGE UP (ref 4.2–5.4)
RBC # FLD: 17 % — HIGH (ref 11.5–14.5)
SARS-COV-2 RNA SPEC QL NAA+PROBE: SIGNIFICANT CHANGE UP
SODIUM SERPL-SCNC: 137 MMOL/L — SIGNIFICANT CHANGE UP (ref 135–146)
WBC # BLD: 22.56 K/UL — HIGH (ref 4.8–10.8)
WBC # FLD AUTO: 22.56 K/UL — HIGH (ref 4.8–10.8)

## 2021-09-24 PROCEDURE — 99233 SBSQ HOSP IP/OBS HIGH 50: CPT

## 2021-09-24 PROCEDURE — 74230 X-RAY XM SWLNG FUNCJ C+: CPT | Mod: 26

## 2021-09-24 RX ORDER — JNJ-78436735 50000000000 [PFU]/.5ML
0.5 SUSPENSION INTRAMUSCULAR ONCE
Refills: 0 | Status: COMPLETED | OUTPATIENT
Start: 2021-09-24 | End: 2021-09-24

## 2021-09-24 RX ORDER — POTASSIUM CHLORIDE 20 MEQ
40 PACKET (EA) ORAL ONCE
Refills: 0 | Status: COMPLETED | OUTPATIENT
Start: 2021-09-24 | End: 2021-09-24

## 2021-09-24 RX ADMIN — PANTOPRAZOLE SODIUM 40 MILLIGRAM(S): 20 TABLET, DELAYED RELEASE ORAL at 05:10

## 2021-09-24 RX ADMIN — Medication 40 MILLIEQUIVALENT(S): at 10:59

## 2021-09-24 RX ADMIN — APIXABAN 2.5 MILLIGRAM(S): 2.5 TABLET, FILM COATED ORAL at 18:14

## 2021-09-24 RX ADMIN — JNJ-78436735 0.5 MILLILITER(S): 50000000000 SUSPENSION INTRAMUSCULAR at 15:57

## 2021-09-24 RX ADMIN — SIMVASTATIN 20 MILLIGRAM(S): 20 TABLET, FILM COATED ORAL at 22:49

## 2021-09-24 RX ADMIN — Medication 12.5 MILLIGRAM(S): at 05:10

## 2021-09-24 RX ADMIN — APIXABAN 2.5 MILLIGRAM(S): 2.5 TABLET, FILM COATED ORAL at 05:10

## 2021-09-24 RX ADMIN — CHLORHEXIDINE GLUCONATE 1 APPLICATION(S): 213 SOLUTION TOPICAL at 05:15

## 2021-09-24 NOTE — SWALLOW VFSS/MBS ASSESSMENT ADULT - SLP PERTINENT HISTORY OF CURRENT PROBLEM
Pt is a 89F w/ PMH RA, pulmonary fibrosis, and afib (on eliquis) being admitted for PNA after collapsing while trying to walk from the car into the lab clinic. Collateral obtained from daughter at bedside. Pt became very short of breath while walking today and collapsed in front of the lab clinic. Prior to this, pt's daughter noticed increased LE edema this AM. Daughter also noted that the patient was coughing while she was eating 2d ago. Pt reports SOB is worse with ambulation. Pt w/o reported hx of CHF. Of note, pt has been taking 5mg of prednisone on and off recently for hx RA. Denies HA, dizziness, fever/chills, chest pain, NVD, abdominal pain, change in urination and change in BM.  Chest X-Ray on 9/23/2021 significant for: diffuse bilateral lung opacities;

## 2021-09-24 NOTE — CHART NOTE - NSCHARTNOTEFT_GEN_A_CORE
Was requested to covid swab pt prior to dc to [facility].   Patient seen at bedside resting comfortably.  Routine Covid swab obtained via nare, atraumatic no complications  Patient without questions of concerns at this time.  Swab sent to lab.

## 2021-09-24 NOTE — CDI QUERY NOTE - NSCDIOTHERTXTBX_GEN_ALL_CORE_HH
_________________________________________________________________________________________________________________________________    89 F,  Diagnosis:  Acute on chronic hypoxic respiratory failure, Severe AS, Pulmonary Fibrosis,    Clinical Indicator:   BNP= 3366  CXR:  Enlarged cardiac silhouette  ECHO:  9/22/2021:  EF 55-60%  Documentation:  9/21/2021: ED: Attending:  Congestive Heart Failure                    H/P:  Attending: PE:  + LE edema, Assessment:   Fluid Overload  Meds:  Furosemide 40 mg IV push 9/21-9/23),  hydrochlorotiazide 12.5 mg po 9/22/2021 - 8/21/2022    Based on your professional judgment and clinical indicators, can the diagnosis of Congestive Heart Failure be specified as:    [] Acute Congestive Heart Failure ruled in (please specify type Diastolic, Systolic, Combined)   [] Acute Congestive Heart Failure ruled out  [] Other (please specify)  [] Clinically unable to determine    Thank you,

## 2021-09-24 NOTE — SWALLOW VFSS/MBS ASSESSMENT ADULT - RECOMMENDED FEEDING/EATING TECHNIQUES
alternate food with liquid/check mouth frequently for oral residue/pocketing/maintain upright posture during/after eating for 30 mins/position upright (90 degrees)

## 2021-09-24 NOTE — SWALLOW VFSS/MBS ASSESSMENT ADULT - ORAL PHASE
Delayed oral transit time/Residue in oral cavity/Incomplete tongue to palate contact Delayed oral transit time/Reduced anterior - posterior transport/Residue in oral cavity/Incomplete tongue to palate contact

## 2021-09-25 LAB
ALBUMIN SERPL ELPH-MCNC: 3.1 G/DL — LOW (ref 3.5–5.2)
ALP SERPL-CCNC: 95 U/L — SIGNIFICANT CHANGE UP (ref 30–115)
ALT FLD-CCNC: 11 U/L — SIGNIFICANT CHANGE UP (ref 0–41)
ANION GAP SERPL CALC-SCNC: 14 MMOL/L — SIGNIFICANT CHANGE UP (ref 7–14)
AST SERPL-CCNC: 17 U/L — SIGNIFICANT CHANGE UP (ref 0–41)
BASOPHILS # BLD AUTO: 0.04 K/UL — SIGNIFICANT CHANGE UP (ref 0–0.2)
BASOPHILS NFR BLD AUTO: 0.2 % — SIGNIFICANT CHANGE UP (ref 0–1)
BILIRUB SERPL-MCNC: 1.2 MG/DL — SIGNIFICANT CHANGE UP (ref 0.2–1.2)
BUN SERPL-MCNC: 30 MG/DL — HIGH (ref 10–20)
CALCIUM SERPL-MCNC: 8.8 MG/DL — SIGNIFICANT CHANGE UP (ref 8.5–10.1)
CHLORIDE SERPL-SCNC: 96 MMOL/L — LOW (ref 98–110)
CO2 SERPL-SCNC: 29 MMOL/L — SIGNIFICANT CHANGE UP (ref 17–32)
CREAT SERPL-MCNC: 0.7 MG/DL — SIGNIFICANT CHANGE UP (ref 0.7–1.5)
EOSINOPHIL # BLD AUTO: 0.08 K/UL — SIGNIFICANT CHANGE UP (ref 0–0.7)
EOSINOPHIL NFR BLD AUTO: 0.4 % — SIGNIFICANT CHANGE UP (ref 0–8)
GLUCOSE SERPL-MCNC: 108 MG/DL — HIGH (ref 70–99)
HCT VFR BLD CALC: 42.1 % — SIGNIFICANT CHANGE UP (ref 37–47)
HGB BLD-MCNC: 13.3 G/DL — SIGNIFICANT CHANGE UP (ref 12–16)
IMM GRANULOCYTES NFR BLD AUTO: 0.4 % — HIGH (ref 0.1–0.3)
LYMPHOCYTES # BLD AUTO: 1.39 K/UL — SIGNIFICANT CHANGE UP (ref 1.2–3.4)
LYMPHOCYTES # BLD AUTO: 7.7 % — LOW (ref 20.5–51.1)
MAGNESIUM SERPL-MCNC: 2 MG/DL — SIGNIFICANT CHANGE UP (ref 1.8–2.4)
MCHC RBC-ENTMCNC: 27.1 PG — SIGNIFICANT CHANGE UP (ref 27–31)
MCHC RBC-ENTMCNC: 31.6 G/DL — LOW (ref 32–37)
MCV RBC AUTO: 85.7 FL — SIGNIFICANT CHANGE UP (ref 81–99)
MONOCYTES # BLD AUTO: 1.91 K/UL — HIGH (ref 0.1–0.6)
MONOCYTES NFR BLD AUTO: 10.6 % — HIGH (ref 1.7–9.3)
NEUTROPHILS # BLD AUTO: 14.45 K/UL — HIGH (ref 1.4–6.5)
NEUTROPHILS NFR BLD AUTO: 80.7 % — HIGH (ref 42.2–75.2)
NRBC # BLD: 0 /100 WBCS — SIGNIFICANT CHANGE UP (ref 0–0)
PHOSPHATE SERPL-MCNC: 3.6 MG/DL — SIGNIFICANT CHANGE UP (ref 2.1–4.9)
PLATELET # BLD AUTO: 262 K/UL — SIGNIFICANT CHANGE UP (ref 130–400)
POTASSIUM SERPL-MCNC: 3.9 MMOL/L — SIGNIFICANT CHANGE UP (ref 3.5–5)
POTASSIUM SERPL-SCNC: 3.9 MMOL/L — SIGNIFICANT CHANGE UP (ref 3.5–5)
PROT SERPL-MCNC: 5.3 G/DL — LOW (ref 6–8)
RBC # BLD: 4.91 M/UL — SIGNIFICANT CHANGE UP (ref 4.2–5.4)
RBC # FLD: 17 % — HIGH (ref 11.5–14.5)
SODIUM SERPL-SCNC: 139 MMOL/L — SIGNIFICANT CHANGE UP (ref 135–146)
WBC # BLD: 17.95 K/UL — HIGH (ref 4.8–10.8)
WBC # FLD AUTO: 17.95 K/UL — HIGH (ref 4.8–10.8)

## 2021-09-25 PROCEDURE — 99232 SBSQ HOSP IP/OBS MODERATE 35: CPT

## 2021-09-25 RX ADMIN — Medication 5 MILLIGRAM(S): at 05:28

## 2021-09-25 RX ADMIN — SIMVASTATIN 20 MILLIGRAM(S): 20 TABLET, FILM COATED ORAL at 21:28

## 2021-09-25 RX ADMIN — PANTOPRAZOLE SODIUM 40 MILLIGRAM(S): 20 TABLET, DELAYED RELEASE ORAL at 05:28

## 2021-09-25 RX ADMIN — APIXABAN 2.5 MILLIGRAM(S): 2.5 TABLET, FILM COATED ORAL at 05:27

## 2021-09-25 RX ADMIN — APIXABAN 2.5 MILLIGRAM(S): 2.5 TABLET, FILM COATED ORAL at 17:03

## 2021-09-25 RX ADMIN — CHLORHEXIDINE GLUCONATE 1 APPLICATION(S): 213 SOLUTION TOPICAL at 05:27

## 2021-09-26 PROCEDURE — 71045 X-RAY EXAM CHEST 1 VIEW: CPT | Mod: 26

## 2021-09-26 PROCEDURE — 99231 SBSQ HOSP IP/OBS SF/LOW 25: CPT

## 2021-09-26 RX ADMIN — APIXABAN 2.5 MILLIGRAM(S): 2.5 TABLET, FILM COATED ORAL at 11:15

## 2021-09-26 RX ADMIN — SIMVASTATIN 20 MILLIGRAM(S): 20 TABLET, FILM COATED ORAL at 22:47

## 2021-09-26 RX ADMIN — PANTOPRAZOLE SODIUM 40 MILLIGRAM(S): 20 TABLET, DELAYED RELEASE ORAL at 11:14

## 2021-09-26 RX ADMIN — APIXABAN 2.5 MILLIGRAM(S): 2.5 TABLET, FILM COATED ORAL at 17:04

## 2021-09-26 RX ADMIN — Medication 5 MILLIGRAM(S): at 11:15

## 2021-09-26 RX ADMIN — Medication 12.5 MILLIGRAM(S): at 11:14

## 2021-09-26 NOTE — PROVIDER CONTACT NOTE (OTHER) - SITUATION
Pt on 2LNC O2 85%.  Pt does not appear in distress, denies SOB.  O2 increased to 4LNC, O2 up to 88%.  40% VM placed, pt currently 93%.  DR Mukherjee notified

## 2021-09-26 NOTE — PROGRESS NOTE ADULT - TIME BILLING
Direct patient care.

## 2021-09-26 NOTE — PROGRESS NOTE ADULT - SUBJECTIVE AND OBJECTIVE BOX
89F w/ PMH RA, pulmonary fibrosis, and afib (on eliquis) presented to ED after becoming SOB and weak on her way to PMD- was hypoxic to 86% in the field.    Today:  Seen at bedside, no complaints.          REVIEW OF SYSTEMS:  No new complaints        MEDICATIONS  (STANDING):  apixaban 2.5 milliGRAM(s) Oral two times a day  chlorhexidine 4% Liquid 1 Application(s) Topical <User Schedule>  furosemide   Injectable 40 milliGRAM(s) IV Push daily  hydrochlorothiazide 12.5 milliGRAM(s) Oral daily  hydrochlorothiazide 12.5 milliGRAM(s) Oral once  influenza   Vaccine 0.5 milliLiter(s) IntraMuscular once  pantoprazole    Tablet 40 milliGRAM(s) Oral before breakfast  simvastatin 20 milliGRAM(s) Oral at bedtime    MEDICATIONS  (PRN):  acetaminophen   Tablet .. 650 milliGRAM(s) Oral every 6 hours PRN Temp greater or equal to 38C (100.4F), Mild Pain (1 - 3)  albuterol/ipratropium for Nebulization 3 milliLiter(s) Nebulizer every 6 hours PRN Shortness of Breath and/or Wheezing      Allergies  penicillin (Short breath; Rash)        FAMILY HISTORY:  No pertinent family history in first degree relatives        Vital Signs Last 24 Hrs  T(C): 36.2 (23 Sep 2021 05:19), Max: 36.2 (23 Sep 2021 05:19)  T(F): 97.2 (23 Sep 2021 05:19), Max: 97.2 (23 Sep 2021 05:19)  HR: 81 (23 Sep 2021 05:19) (81 - 97)  BP: 109/56 (23 Sep 2021 05:19) (109/56 - 123/55)  RR: 18 (23 Sep 2021 05:19) (18 - 18)  SpO2: 91% (23 Sep 2021 07:59) (91% - 92%)    PHYSICAL EXAM:  GENERAL: NAD  HEAD:  Atraumatic, Normocephalic  NECK: Supple, No JVD, Normal thyroid  NERVOUS SYSTEM:  Alert & Oriented X3, Good concentration  CHEST/LUNG: Rhonchi b/l  HEART: Regular rate and rhythm; No murmurs, rubs, or gallops  ABDOMEN: Soft, Nontender, Nondistended; Bowel sounds present      LABS:                        14.1   17.33 )-----------( 264      ( 23 Sep 2021 06:10 )             44.7         138  |  97<L>  |  16  ----------------------------<  119<H>  3.6   |  29  |  0.7    Ca    8.9      23 Sep 2021 06:10    TPro  5.8<L>  /  Alb  3.8  /  TBili  1.1  /  DBili  x   /  AST  14  /  ALT  10  /  AlkPhos  99        Urinalysis Basic - ( 21 Sep 2021 13:30 )    Color: Yellow / Appearance: Clear / S.020 / pH: x  Gluc: x / Ketone: Negative  / Bili: Negative / Urobili: 1.0 mg/dL   Blood: x / Protein: 30 mg/dL / Nitrite: Negative   Leuk Esterase: Negative / RBC: 1-2 /HPF / WBC 1-2 /HPF   Sq Epi: x / Non Sq Epi: Few /HPF / Bacteria: Few        
89F w/ PMH RA, pulmonary fibrosis, and afib (on eliquis) presented to ED after becoming SOB and weak on her way to PMD- was hypoxic to 86% in the field.    Today:  Seen at bedside, no new complaints.        REVIEW OF SYSTEMS:  No new complaints.      MEDICATIONS  (STANDING):  apixaban 2.5 milliGRAM(s) Oral two times a day  chlorhexidine 4% Liquid 1 Application(s) Topical <User Schedule>  hydrochlorothiazide 12.5 milliGRAM(s) Oral daily  hydrochlorothiazide 12.5 milliGRAM(s) Oral once  influenza   Vaccine 0.5 milliLiter(s) IntraMuscular once  pantoprazole    Tablet 40 milliGRAM(s) Oral before breakfast  simvastatin 20 milliGRAM(s) Oral at bedtime    MEDICATIONS  (PRN):  acetaminophen   Tablet .. 650 milliGRAM(s) Oral every 6 hours PRN Temp greater or equal to 38C (100.4F), Mild Pain (1 - 3)  albuterol/ipratropium for Nebulization 3 milliLiter(s) Nebulizer every 6 hours PRN Shortness of Breath and/or Wheezing      Allergies  penicillin (Short breath; Rash)        FAMILY HISTORY:  No pertinent family history in first degree relatives        Vital Signs Last 24 Hrs  T(C): 35.8 (24 Sep 2021 05:00), Max: 36.4 (23 Sep 2021 21:00)  T(F): 96.5 (24 Sep 2021 05:00), Max: 97.6 (23 Sep 2021 21:00)  HR: 101 (24 Sep 2021 05:00) (101 - 133)  BP: 105/51 (24 Sep 2021 05:00) (105/51 - 124/68)  RR: 18 (24 Sep 2021 05:00) (17 - 18)  SpO2: 95% (23 Sep 2021 22:25) (95% - 95%)    PHYSICAL EXAM:  GENERAL: NAD  HEAD:  Atraumatic, Normocephalic  NECK: Supple, No JVD, Normal thyroid  NERVOUS SYSTEM:  Alert & Oriented X3, Good concentration  CHEST/LUNG: Rhonchi b/l  HEART: Regular rate and rhythm; No murmurs, rubs, or gallops  ABDOMEN: Soft, Nontender, Nondistended; Bowel sounds present      LABS:                        13.8   22.56 )-----------( 251      ( 24 Sep 2021 05:50 )             42.9     09-24    137  |  94<L>  |  19  ----------------------------<  114<H>  3.2<L>   |  30  |  0.6<L>    Ca    8.9      24 Sep 2021 05:50  Phos  3.4     09-24  Mg     1.9     09-24    TPro  5.5<L>  /  Alb  3.5  /  TBili  1.3<H>  /  DBili  x   /  AST  9   /  ALT  8   /  AlkPhos  95  09-24        
89F w/ PMH RA, pulmonary fibrosis, and afib (on eliquis) presented to ED after becoming SOB and weak on her way to PMD- was hypoxic to 86% in the field.    Today:  Seen at bedside, denies cough, fever, chills.  States she felt more SOB than usual.          REVIEW OF SYSTEMS:  SOB      MEDICATIONS  (STANDING):  apixaban 2.5 milliGRAM(s) Oral two times a day  chlorhexidine 4% Liquid 1 Application(s) Topical <User Schedule>  furosemide   Injectable 40 milliGRAM(s) IV Push daily  hydrochlorothiazide 12.5 milliGRAM(s) Oral daily  hydrochlorothiazide 12.5 milliGRAM(s) Oral once  influenza   Vaccine 0.5 milliLiter(s) IntraMuscular once  pantoprazole    Tablet 40 milliGRAM(s) Oral before breakfast  simvastatin 20 milliGRAM(s) Oral at bedtime    MEDICATIONS  (PRN):  acetaminophen   Tablet .. 650 milliGRAM(s) Oral every 6 hours PRN Temp greater or equal to 38C (100.4F), Mild Pain (1 - 3)  albuterol/ipratropium for Nebulization 3 milliLiter(s) Nebulizer every 6 hours PRN Shortness of Breath and/or Wheezing      Allergies  penicillin (Short breath; Rash)        FAMILY HISTORY:  No pertinent family history in first degree relatives        Vital Signs Last 24 Hrs  T(C): 36.1 (22 Sep 2021 05:06), Max: 37.2 (21 Sep 2021 15:03)  T(F): 96.9 (22 Sep 2021 05:06), Max: 99 (21 Sep 2021 15:03)  HR: 101 (22 Sep 2021 05:06) (77 - 101)  BP: 109/58 (22 Sep 2021 05:06) (108/62 - 115/66)  RR: 18 (22 Sep 2021 05:06) (18 - 20)  SpO2: 92% (22 Sep 2021 09:29) (92% - 96%)    PHYSICAL EXAM:  GENERAL: lethargic, drowsy  HEAD:  Atraumatic, Normocephalic  EYES: EOMI, PERRLA, conjunctiva and sclera clear  ENMT: No tonsillar erythema, exudates, or enlargement; Moist mucous membranes, Good dentition, No lesions  NECK: Supple, No JVD, Normal thyroid  NERVOUS SYSTEM:  Alert & Oriented X3, Good concentration  CHEST/LUNG: Rhonchi b/l  HEART: Regular rate and rhythm; No murmurs, rubs, or gallops  ABDOMEN: Soft, Nontender, Nondistended; Bowel sounds present        LABS:                        13.2   12.53 )-----------( 286      ( 22 Sep 2021 07:03 )             42.0         137  |  99  |  16  ----------------------------<  132<H>  3.7   |  24  |  0.7    Ca    8.9      22 Sep 2021 07:03    TPro  5.9<L>  /  Alb  3.8  /  TBili  0.9  /  DBili  x   /  AST  17  /  ALT  12  /  AlkPhos  103  -    PT/INR - ( 21 Sep 2021 10:50 )   PT: 15.10 sec;   INR: 1.32 ratio         PTT - ( 21 Sep 2021 10:50 )  PTT:38.8 sec  Urinalysis Basic - ( 21 Sep 2021 13:30 )    Color: Yellow / Appearance: Clear / S.020 / pH: x  Gluc: x / Ketone: Negative  / Bili: Negative / Urobili: 1.0 mg/dL   Blood: x / Protein: 30 mg/dL / Nitrite: Negative   Leuk Esterase: Negative / RBC: 1-2 /HPF / WBC 1-2 /HPF   Sq Epi: x / Non Sq Epi: Few /HPF / Bacteria: Few      
89F w/ PMH RA, pulmonary fibrosis, and afib (on eliquis) presented to ED after becoming SOB and weak on her way to PMD- was hypoxic to 86% in the field.    Today:  Seen at bedside, no new complaints.        REVIEW OF SYSTEMS:  no new complaints      MEDICATIONS  (STANDING):  apixaban 2.5 milliGRAM(s) Oral two times a day  chlorhexidine 4% Liquid 1 Application(s) Topical <User Schedule>  hydrochlorothiazide 12.5 milliGRAM(s) Oral daily  hydrochlorothiazide 12.5 milliGRAM(s) Oral once  influenza   Vaccine 0.5 milliLiter(s) IntraMuscular once  pantoprazole    Tablet 40 milliGRAM(s) Oral before breakfast  predniSONE   Tablet 5 milliGRAM(s) Oral daily  simvastatin 20 milliGRAM(s) Oral at bedtime    MEDICATIONS  (PRN):  acetaminophen   Tablet .. 650 milliGRAM(s) Oral every 6 hours PRN Temp greater or equal to 38C (100.4F), Mild Pain (1 - 3)  albuterol/ipratropium for Nebulization 3 milliLiter(s) Nebulizer every 6 hours PRN Shortness of Breath and/or Wheezing      Allergies  penicillin (Short breath; Rash)        FAMILY HISTORY:  No pertinent family history in first degree relatives        Vital Signs Last 24 Hrs  T(C): 36.4 (25 Sep 2021 05:10), Max: 36.4 (24 Sep 2021 21:00)  T(F): 97.5 (25 Sep 2021 05:10), Max: 97.5 (24 Sep 2021 21:00)  HR: 91 (25 Sep 2021 05:10) (91 - 121)  BP: 97/55 (25 Sep 2021 05:10) (97/55 - 126/58)  RR: 16 (25 Sep 2021 09:15) (16 - 16)  SpO2: 94% (25 Sep 2021 09:15) (91% - 94%)    PHYSICAL EXAM:  GENERAL: NAD  HEAD:  Atraumatic, Normocephalic  NECK: Supple, No JVD, Normal thyroid  NERVOUS SYSTEM:  Alert & Oriented X3, Good concentration  CHEST/LUNG: Rhonchi b/l  HEART: Regular rate and rhythm; No murmurs, rubs, or gallops  ABDOMEN: Soft, Nontender, Nondistended; Bowel sounds present      LABS:                        13.3   17.95 )-----------( 262      ( 25 Sep 2021 06:39 )             42.1     09-25    139  |  96<L>  |  30<H>  ----------------------------<  108<H>  3.9   |  29  |  0.7    Ca    8.8      25 Sep 2021 06:39  Phos  3.6     09-25  Mg     2.0     09-25    TPro  5.3<L>  /  Alb  3.1<L>  /  TBili  1.2  /  DBili  x   /  AST  17  /  ALT  11  /  AlkPhos  95  09-25        
89F w/ PMH RA, pulmonary fibrosis, and afib (on eliquis) presented to ED after becoming SOB and weak on her way to PMD- was hypoxic to 86% in the field.    Today:  Seen at bedside, no complaints.        REVIEW OF SYSTEMS:  No new complaints.        MEDICATIONS  (STANDING):  apixaban 2.5 milliGRAM(s) Oral two times a day  chlorhexidine 4% Liquid 1 Application(s) Topical <User Schedule>  hydrochlorothiazide 12.5 milliGRAM(s) Oral daily  hydrochlorothiazide 12.5 milliGRAM(s) Oral once  influenza   Vaccine 0.5 milliLiter(s) IntraMuscular once  pantoprazole    Tablet 40 milliGRAM(s) Oral before breakfast  predniSONE   Tablet 5 milliGRAM(s) Oral daily  simvastatin 20 milliGRAM(s) Oral at bedtime    MEDICATIONS  (PRN):  acetaminophen   Tablet .. 650 milliGRAM(s) Oral every 6 hours PRN Temp greater or equal to 38C (100.4F), Mild Pain (1 - 3)  albuterol/ipratropium for Nebulization 3 milliLiter(s) Nebulizer every 6 hours PRN Shortness of Breath and/or Wheezing      Allergies  penicillin (Short breath; Rash)      FAMILY HISTORY:  No pertinent family history in first degree relatives        Vital Signs Last 24 Hrs  T(C): 35.9 (26 Sep 2021 14:21), Max: 36.7 (25 Sep 2021 21:50)  T(F): 96.7 (26 Sep 2021 14:21), Max: 98.1 (25 Sep 2021 21:50)  HR: 77 (26 Sep 2021 14:21) (77 - 108)  BP: 112/51 (26 Sep 2021 14:21) (89/51 - 119/59)  RR: 16 (26 Sep 2021 14:21) (16 - 16)  SpO2: 95% (26 Sep 2021 13:14) (85% - 97%)    PHYSICAL EXAM:  GENERAL: NAD  HEAD:  Atraumatic, Normocephalic  NECK: Supple, No JVD, Normal thyroid  NERVOUS SYSTEM:  Alert & Oriented X3, Good concentration  CHEST/LUNG: Rhonchi b/l  HEART: Regular rate and rhythm; No murmurs, rubs, or gallops  ABDOMEN: Soft, Nontender, Nondistended; Bowel sounds present      LABS:                        13.3   17.95 )-----------( 262      ( 25 Sep 2021 06:39 )             42.1     09-25    139  |  96<L>  |  30<H>  ----------------------------<  108<H>  3.9   |  29  |  0.7    Ca    8.8      25 Sep 2021 06:39  Phos  3.6     09-25  Mg     2.0     09-25    TPro  5.3<L>  /  Alb  3.1<L>  /  TBili  1.2  /  DBili  x   /  AST  17  /  ALT  11  /  AlkPhos  95  09-25

## 2021-09-26 NOTE — PROGRESS NOTE ADULT - ASSESSMENT
89F w/ PMH RA, pulmonary fibrosis, and afib (on eliquis) presented to ED after becoming SOB and weak on her way to PMD- was hypoxic to 86% in the field.    #Acute on chronic hypoxic respiratory failure  #Severe AS  #Pulmonary fibrosis  #LE edema  -Cardiac enzymes x 3-negative  -CTA chest, LE duplex neg for PE, DVT  -supplemental oxygen-will require home O2  -DC abx as procal neg and patient's presentation not consistent with PNA  -RVP neg  -TTE shows severe AS- cardiology consult appreciated, not a candidate for valve replacement.  On HCTZ, will not add diuresis  -pulm consult appreciated    #Chronic afib  -on eliquis    #Obesity  -diet and lifestyle modification    #DL  -on statin    #Dysphagia  -speech and swallow eval appreciated,- Dysphagia 3 with nectar liquids    RA  -started prednisone PO after discussion with patient's rheumatologist    DNR/DNI    Given Kash COVID 19 vaccination 9/24/21 per daughter's request  DC planning to STR  
89F w/ PMH RA, pulmonary fibrosis, and afib (on eliquis) presented to ED after becoming SOB and weak on her way to PMD- was hypoxic to 86% in the field.    #Acute on chronic hypoxic respiratory failure  #Severe AS  #Pulmonary fibrosis  #LE edema  -Cardiac enzymes x 3-negative  -CTA chest, LE duplex neg for PE, DVT  -supplemental oxygen  -DC abx as procal neg and patient's presentation not consistent with PNA  -RVP neg  -TTE shows severe AS- cardiology consult  -pulm consult appreciated    #Chronic afib  -on eliquis    #Obesity  -diet and lifestyle modification    #DL  -on statin    #Dysphagia  -start puree diet  -speech and swallow eval pending    RA  -previously on prednisone at home    DNR/DNI  
89F w/ PMH RA, pulmonary fibrosis, and afib (on eliquis) presented to ED after becoming SOB and weak on her way to PMD- was hypoxic to 86% in the field.    #Acute on chronic hypoxic respiratory failure  #Severe AS  #Pulmonary fibrosis  #LE edema  -Cardiac enzymes x 3-negative  -CTA chest, LE duplex neg for PE, DVT  -supplemental oxygen-will require home O2  -DC abx as procal neg and patient's presentation not consistent with PNA  -RVP neg  -TTE shows severe AS- cardiology consult appreciated, not a candidate for valve replacement.  On HCTZ, will not add diuresis  -pulm consult appreciated    #Chronic afib  -on eliquis    #Obesity  -diet and lifestyle modification    #DL  -on statin    #Dysphagia  -speech and swallow eval appreciated,- Dysphagia 1 for now, follow barium swallow XR    RA  -previously on prednisone at home    DNR/DNI  
89F w/ PMH RA, pulmonary fibrosis, and afib (on eliquis) presented to ED after becoming SOB and weak on her way to PMD- was hypoxic to 86% in the field.    #Acute on chronic hypoxic respiratory failure  #Severe AS  #Pulmonary fibrosis  #LE edema  -Cardiac enzymes x 3-negative  -CTA chest, LE duplex neg for PE, DVT  -supplemental oxygen-will require home O2  -DC abx as procal neg and patient's presentation not consistent with PNA  -RVP neg  -TTE shows severe AS- cardiology consult appreciated, not a candidate for valve replacement.  On HCTZ, will not add diuresis  -pulm consult appreciated    #Chronic afib  -on eliquis    #Obesity  -diet and lifestyle modification    #DL  -on statin    #Dysphagia  -speech and swallow eval appreciated,- Dysphagia 3 with nectar liquids    RA  -started prednisone PO after discussion with patient's rheumatologist    DNR/DNI    Given Kash COVID 19 vaccination 9/24/21 per daughter's request  DC planning to STR  
89F w/ PMH RA, pulmonary fibrosis, and afib (on eliquis) presented to ED after becoming SOB and weak on her way to PMD- was hypoxic to 86% in the field.    #Acute on chronic hypoxic respiratory failure  #Pulmonary fibrosis  #LE edema  -Cardiac enzymes x 3-negative  -supplemental oxygen  -DC abx as procal neg and patient's presentation not consistent with PNA  -Check RVP  -IV Lasix until TTE resulted  -pulm consult appreciated    #Chronic afib  -on eliquis    #Obesity  -diet and lifestyle modification    #DL  -on statin    #Dysphagia  -start puree diet  -speech and swallow eval pending    RA  -previously on prednisone at home    DNR/DNI

## 2021-09-27 ENCOUNTER — TRANSCRIPTION ENCOUNTER (OUTPATIENT)
Age: 86
End: 2021-09-27

## 2021-09-27 VITALS
TEMPERATURE: 98 F | RESPIRATION RATE: 16 BRPM | HEART RATE: 95 BPM | SYSTOLIC BLOOD PRESSURE: 132 MMHG | DIASTOLIC BLOOD PRESSURE: 60 MMHG

## 2021-09-27 LAB — SARS-COV-2 RNA SPEC QL NAA+PROBE: SIGNIFICANT CHANGE UP

## 2021-09-27 PROCEDURE — 99239 HOSP IP/OBS DSCHRG MGMT >30: CPT

## 2021-09-27 RX ORDER — AMLODIPINE BESYLATE 2.5 MG/1
1 TABLET ORAL
Qty: 0 | Refills: 0 | DISCHARGE

## 2021-09-27 RX ADMIN — PANTOPRAZOLE SODIUM 40 MILLIGRAM(S): 20 TABLET, DELAYED RELEASE ORAL at 05:36

## 2021-09-27 RX ADMIN — Medication 12.5 MILLIGRAM(S): at 05:36

## 2021-09-27 RX ADMIN — Medication 5 MILLIGRAM(S): at 05:37

## 2021-09-27 RX ADMIN — APIXABAN 2.5 MILLIGRAM(S): 2.5 TABLET, FILM COATED ORAL at 17:36

## 2021-09-27 RX ADMIN — INFLUENZA VIRUS VACCINE 0.5 MILLILITER(S): 15; 15; 15; 15 SUSPENSION INTRAMUSCULAR at 14:18

## 2021-09-27 RX ADMIN — APIXABAN 2.5 MILLIGRAM(S): 2.5 TABLET, FILM COATED ORAL at 05:37

## 2021-09-27 RX ADMIN — CHLORHEXIDINE GLUCONATE 1 APPLICATION(S): 213 SOLUTION TOPICAL at 05:32

## 2021-09-27 NOTE — DISCHARGE NOTE PROVIDER - NSDCMRMEDTOKEN_GEN_ALL_CORE_FT
Eliquis 2.5 mg oral tablet: 1 tab(s) orally 2 times a day  hydroCHLOROthiazide 12.5 mg oral tablet: 1 tab(s) orally once a day  predniSONE 5 mg oral tablet: 1 tab(s) orally once a day  simvastatin 20 mg oral tablet: orally once a day

## 2021-09-27 NOTE — DISCHARGE NOTE PROVIDER - HOSPITAL COURSE
89F w/ PMH RA, pulmonary fibrosis, and afib (on eliquis) presented to ED after becoming SOB and weak on her way to PMD- was hypoxic to 86% in the field.    #Acute on chronic hypoxic respiratory failure  #Severe AS  #Pulmonary fibrosis  #LE edema  -Cardiac enzymes x 3-negative  -CTA chest, LE duplex neg for PE, DVT  -supplemental oxygen-will require home O2  -DC abx as procal neg and patient's presentation not consistent with PNA  -RVP neg  -TTE shows severe AS- cardiology consult appreciated, not a candidate for valve replacement.  On HCTZ, will not add diuresis  -pulm consult appreciated    #Chronic afib  -on eliquis    #Obesity  -diet and lifestyle modification    #DL  -on statin    #Dysphagia  -speech and swallow eval appreciated,- Dysphagia 3 with nectar liquids    RA  -started prednisone PO after discussion with patient's rheumatologist    DNR/DNI    Given Kash COVID 19 vaccination 9/24/21 per daughter's request

## 2021-09-27 NOTE — DISCHARGE NOTE NURSING/CASE MANAGEMENT/SOCIAL WORK - NSDCVIVACCINE_GEN_ALL_CORE_FT
COVID-19 vaccine, vector-nr, rS-Ad26, PF, 0.5 mL (Kash); 24-Sep-2021 15:57; Jessica Isaac (RN); Kash; [id number] (Exp. Date: 24-Sep-2021); IntraMuscular; Deltoid Left.; 0.5 milliLiter(s);   influenza, injectable, quadrivalent, preservative free; 27-Sep-2021 14:18; Monae Arnold (RN); [employer]; [id number] (Exp. Date: 30-Jun-2022); IntraMuscular; Deltoid Right.; 0.5 milliLiter(s); VIS (VIS Published: 15-Aug-2019, VIS Presented: 27-Sep-2021);

## 2021-09-27 NOTE — DISCHARGE NOTE PROVIDER - NSDCCPCAREPLAN_GEN_ALL_CORE_FT
PRINCIPAL DISCHARGE DIAGNOSIS  Diagnosis: Shortness of breath  Assessment and Plan of Treatment: You have fibrosis of your lungs and a narrow heart valve which the cardiologists do not think can be repaired.  You will need to be on permanent oxygen at home.

## 2021-09-27 NOTE — DISCHARGE NOTE NURSING/CASE MANAGEMENT/SOCIAL WORK - PATIENT PORTAL LINK FT
You can access the FollowMyHealth Patient Portal offered by [facility] by registering at the following website: [email or web address]. By joining [employer]’s FollowMyHealth portal, you will also be able to view your health information using other applications (apps) compatible with our system.

## 2021-10-01 DIAGNOSIS — M06.9 RHEUMATOID ARTHRITIS, UNSPECIFIED: ICD-10-CM

## 2021-10-01 DIAGNOSIS — R79.89 OTHER SPECIFIED ABNORMAL FINDINGS OF BLOOD CHEMISTRY: ICD-10-CM

## 2021-10-01 DIAGNOSIS — Z85.71 PERSONAL HISTORY OF HODGKIN LYMPHOMA: ICD-10-CM

## 2021-10-01 DIAGNOSIS — I10 ESSENTIAL (PRIMARY) HYPERTENSION: ICD-10-CM

## 2021-10-01 DIAGNOSIS — J84.178 OTHER INTERSTITIAL PULMONARY DISEASES WITH FIBROSIS IN DISEASES CLASSIFIED ELSEWHERE: ICD-10-CM

## 2021-10-01 DIAGNOSIS — J84.10 PULMONARY FIBROSIS, UNSPECIFIED: ICD-10-CM

## 2021-10-01 DIAGNOSIS — R06.02 SHORTNESS OF BREATH: ICD-10-CM

## 2021-10-01 DIAGNOSIS — E78.5 HYPERLIPIDEMIA, UNSPECIFIED: ICD-10-CM

## 2021-10-01 DIAGNOSIS — R55 SYNCOPE AND COLLAPSE: ICD-10-CM

## 2021-10-01 DIAGNOSIS — Z87.891 PERSONAL HISTORY OF NICOTINE DEPENDENCE: ICD-10-CM

## 2021-10-01 DIAGNOSIS — Z66 DO NOT RESUSCITATE: ICD-10-CM

## 2021-10-01 DIAGNOSIS — Z88.0 ALLERGY STATUS TO PENICILLIN: ICD-10-CM

## 2021-10-01 DIAGNOSIS — E66.9 OBESITY, UNSPECIFIED: ICD-10-CM

## 2021-10-01 DIAGNOSIS — Z79.01 LONG TERM (CURRENT) USE OF ANTICOAGULANTS: ICD-10-CM

## 2021-10-01 DIAGNOSIS — D72.829 ELEVATED WHITE BLOOD CELL COUNT, UNSPECIFIED: ICD-10-CM

## 2021-10-01 DIAGNOSIS — I35.0 NONRHEUMATIC AORTIC (VALVE) STENOSIS: ICD-10-CM

## 2021-10-01 DIAGNOSIS — R13.10 DYSPHAGIA, UNSPECIFIED: ICD-10-CM

## 2021-10-01 DIAGNOSIS — J96.21 ACUTE AND CHRONIC RESPIRATORY FAILURE WITH HYPOXIA: ICD-10-CM

## 2021-10-01 DIAGNOSIS — E87.5 HYPERKALEMIA: ICD-10-CM

## 2021-10-01 DIAGNOSIS — I48.20 CHRONIC ATRIAL FIBRILLATION, UNSPECIFIED: ICD-10-CM

## 2021-12-02 PROBLEM — J84.10 PULMONARY FIBROSIS, UNSPECIFIED: Chronic | Status: ACTIVE | Noted: 2021-09-21

## 2021-12-02 PROBLEM — C81.90 HODGKIN LYMPHOMA, UNSPECIFIED, UNSPECIFIED SITE: Chronic | Status: ACTIVE | Noted: 2018-08-24

## 2022-01-03 ENCOUNTER — APPOINTMENT (OUTPATIENT)
Dept: CARDIOLOGY | Facility: CLINIC | Age: 87
End: 2022-01-03

## 2022-02-22 NOTE — PATIENT PROFILE ADULT - NSPRESCRALCFREQ_GEN_A_NUR
PROVIDER:[TOKEN:[[id number]],FOLLOWUP:[1 week],ESTABLISHEDPATIENT:[T]],PROVIDER:[TOKEN:[[id number]],FOLLOWUP:[2 weeks],ESTABLISHEDPATIENT:[T]]
Never

## 2023-06-11 NOTE — ED ADULT NURSE NOTE - CHIEF COMPLAINT
Kenneth J Schoendorf discharge to home/self care.      
The patient is a 88y Female complaining of multiple medical complaints.

## 2024-06-26 NOTE — ED PROVIDER NOTE - NS ED ROS FT
Detail Level: Detailed Review of Systems:  	•	CONSTITUTIONAL - no fever, no diaphoresis  	•	SKIN - no rash, no lesions  	•	HEMATOLOGIC - +bleeding, no bruising  	•	EYES - no discharge, no injection  	•	ENT - no otorrhea, +epistaxis  	•	RESPIRATORY - no shortness of breath, no cough  	•	CARDIAC - no chest pain, no palpitations  	•	GI - no abd pain, no nausea, no vomiting, no diarrhea  	•	GENITO-URINARY - no dysuria, no hematuria  	•	MUSCULOSKELETAL - no joint paint, no swelling, no redness  	•	NEUROLOGIC - no weakness, no headache, no anesthesia, no paresthesias